# Patient Record
Sex: FEMALE | Race: WHITE | Employment: UNEMPLOYED | URBAN - METROPOLITAN AREA
[De-identification: names, ages, dates, MRNs, and addresses within clinical notes are randomized per-mention and may not be internally consistent; named-entity substitution may affect disease eponyms.]

---

## 2017-02-06 ENCOUNTER — TELEPHONE (OUTPATIENT)
Dept: OBGYN CLINIC | Facility: CLINIC | Age: 37
End: 2017-02-06

## 2017-02-06 NOTE — TELEPHONE ENCOUNTER
Pt states that lmp 12/31/16. Pt had a home positive. Pt is taking PNV with DHA. Pt agrees to see all MDs at our group. Pt informed to call if she has any problems or spotting/bleeding before her OBN appt with the nurse.    Informed pt of the date, time

## 2017-02-23 ENCOUNTER — NURSE ONLY (OUTPATIENT)
Dept: OBGYN CLINIC | Facility: CLINIC | Age: 37
End: 2017-02-23

## 2017-02-23 ENCOUNTER — TELEPHONE (OUTPATIENT)
Dept: OBGYN CLINIC | Facility: CLINIC | Age: 37
End: 2017-02-23

## 2017-02-23 DIAGNOSIS — Z34.81 ENCOUNTER FOR SUPERVISION OF OTHER NORMAL PREGNANCY IN FIRST TRIMESTER: Primary | ICD-10-CM

## 2017-02-23 DIAGNOSIS — Z34.01 ENCOUNTER FOR SUPERVISION OF NORMAL FIRST PREGNANCY IN FIRST TRIMESTER: Primary | ICD-10-CM

## 2017-02-23 LAB
CONTROL LINE PRESENT WITH A CLEAR BACKGROUND (YES/NO): YES YES/NO
KIT LOT #: NORMAL NUMERIC
PREGNANCY TEST, URINE: POSITIVE

## 2017-02-23 PROCEDURE — 81025 URINE PREGNANCY TEST: CPT | Performed by: OBSTETRICS & GYNECOLOGY

## 2017-02-23 PROCEDURE — 99211 OFF/OP EST MAY X REQ PHY/QHP: CPT | Performed by: OBSTETRICS & GYNECOLOGY

## 2017-02-23 RX ORDER — CHOLECALCIFEROL (VITAMIN D3) 25 MCG
1 TABLET,CHEWABLE ORAL DAILY
COMMUNITY
End: 2017-11-11

## 2017-02-23 NOTE — PROGRESS NOTES
Pt seen for OBN appt today with no complaints. Normal PN labs + HCV and 1 hour glucose ordered. Pt advised all labs must be completed and resulted prior to MD appt. NPN appt with MD scheduled.   Partner's name is Tai Milner contact #[phone]; race Sickle Cell Disease or trait No    Elkin-Sachs Disease No    Thalassemia No    Other inherited genetic or chromosomal disorders No    Patient or baby's father had a child with birth defects not listed above No    Previous miscarriages or stillborn No

## 2017-02-27 ENCOUNTER — LAB ENCOUNTER (OUTPATIENT)
Dept: LAB | Facility: HOSPITAL | Age: 37
End: 2017-02-27
Attending: OBSTETRICS & GYNECOLOGY
Payer: COMMERCIAL

## 2017-02-27 DIAGNOSIS — Z34.81 ENCOUNTER FOR SUPERVISION OF OTHER NORMAL PREGNANCY IN FIRST TRIMESTER: ICD-10-CM

## 2017-02-27 LAB
ANTIBODY SCREEN: NEGATIVE
BASOPHILS # BLD: 0 K/UL (ref 0–0.2)
BASOPHILS NFR BLD: 1 %
EOSINOPHIL # BLD: 0.1 K/UL (ref 0–0.7)
EOSINOPHIL NFR BLD: 1 %
ERYTHROCYTE [DISTWIDTH] IN BLOOD BY AUTOMATED COUNT: 12.6 % (ref 11–15)
GLUCOSE 1H P 50 G GLC PO SERPL-MCNC: 117 MG/DL
HCT VFR BLD AUTO: 41.2 % (ref 35–48)
HGB BLD-MCNC: 13.9 G/DL (ref 12–16)
LYMPHOCYTES # BLD: 1.5 K/UL (ref 1–4)
LYMPHOCYTES NFR BLD: 19 %
MCH RBC QN AUTO: 29.6 PG (ref 27–32)
MCHC RBC AUTO-ENTMCNC: 33.7 G/DL (ref 32–37)
MCV RBC AUTO: 87.8 FL (ref 80–100)
MONOCYTES # BLD: 0.5 K/UL (ref 0–1)
MONOCYTES NFR BLD: 6 %
NEUTROPHILS # BLD AUTO: 6.1 K/UL (ref 1.8–7.7)
NEUTROPHILS NFR BLD: 74 %
PLATELET # BLD AUTO: 312 K/UL (ref 140–400)
PMV BLD AUTO: 8.2 FL (ref 7.4–10.3)
RBC # BLD AUTO: 4.69 M/UL (ref 3.7–5.4)
RH BLOOD TYPE: POSITIVE
RUBV IGG SER-ACNC: 71.2 IU/ML
WBC # BLD AUTO: 8.2 K/UL (ref 4–11)

## 2017-02-27 PROCEDURE — 85025 COMPLETE CBC W/AUTO DIFF WBC: CPT

## 2017-02-27 PROCEDURE — 86900 BLOOD TYPING SEROLOGIC ABO: CPT

## 2017-02-27 PROCEDURE — 86901 BLOOD TYPING SEROLOGIC RH(D): CPT

## 2017-02-27 PROCEDURE — 87086 URINE CULTURE/COLONY COUNT: CPT

## 2017-02-27 PROCEDURE — 86780 TREPONEMA PALLIDUM: CPT

## 2017-02-27 PROCEDURE — 87340 HEPATITIS B SURFACE AG IA: CPT

## 2017-02-27 PROCEDURE — 36415 COLL VENOUS BLD VENIPUNCTURE: CPT

## 2017-02-27 PROCEDURE — 86803 HEPATITIS C AB TEST: CPT

## 2017-02-27 PROCEDURE — 87389 HIV-1 AG W/HIV-1&-2 AB AG IA: CPT

## 2017-02-27 PROCEDURE — 86762 RUBELLA ANTIBODY: CPT

## 2017-02-27 PROCEDURE — 86850 RBC ANTIBODY SCREEN: CPT

## 2017-02-27 PROCEDURE — 82950 GLUCOSE TEST: CPT

## 2017-02-28 LAB
HBV SURFACE AG SERPL QL IA: NONREACTIVE
HCV AB SERPL QL IA: NONREACTIVE
HIV1+2 AB SERPL QL IA: NONREACTIVE

## 2017-03-01 LAB — T PALLIDUM AB SER QL: NEGATIVE

## 2017-03-01 NOTE — TELEPHONE ENCOUNTER
Spoke to the patient informed her order for fts was sent to [facility], patient advised to call the office if she does not hear from them in 1-3 business days. Referral in process.

## 2017-03-02 ENCOUNTER — INITIAL PRENATAL (OUTPATIENT)
Dept: OBGYN CLINIC | Facility: CLINIC | Age: 37
End: 2017-03-02

## 2017-03-02 VITALS
DIASTOLIC BLOOD PRESSURE: 75 MMHG | SYSTOLIC BLOOD PRESSURE: 117 MMHG | BODY MASS INDEX: 26 KG/M2 | HEART RATE: 93 BPM | WEIGHT: 144 LBS

## 2017-03-02 DIAGNOSIS — Z34.91 ENCOUNTER FOR SUPERVISION OF NORMAL PREGNANCY IN FIRST TRIMESTER, UNSPECIFIED GRAVIDITY: Primary | ICD-10-CM

## 2017-03-02 PROBLEM — Z87.59 HISTORY OF SHOULDER DYSTOCIA IN PRIOR PREGNANCY: Status: ACTIVE | Noted: 2017-03-02

## 2017-03-02 LAB
APPEARANCE: CLEAR
MULTISTIX EXPIRATION DATE: NORMAL DATE
MULTISTIX LOT#: NORMAL NUMERIC
PH, URINE: 5 (ref 4.5–8)
SPECIFIC GRAVITY: 1.03 (ref 1–1.03)
URINE-COLOR: YELLOW

## 2017-03-02 PROCEDURE — 76801 OB US < 14 WKS SINGLE FETUS: CPT | Performed by: OBSTETRICS & GYNECOLOGY

## 2017-03-02 PROCEDURE — 99212 OFFICE O/P EST SF 10 MIN: CPT | Performed by: OBSTETRICS & GYNECOLOGY

## 2017-03-02 NOTE — PROGRESS NOTES
Gail Homes at visit. Scheduled for FTS at [facility]. 30d cycles and sure LMP within 1 d. Office US = dates. Daily nausea w/o frequent emesis.

## 2017-03-03 LAB
C TRACH DNA SPEC QL NAA+PROBE: NEGATIVE
HPV I/H RISK 1 DNA SPEC QL NAA+PROBE: NEGATIVE
N GONORRHOEA DNA SPEC QL NAA+PROBE: NEGATIVE
T VAGINALIS RRNA SPEC QL NAA+PROBE: NEGATIVE

## 2017-04-04 ENCOUNTER — TELEPHONE (OUTPATIENT)
Dept: OBGYN CLINIC | Facility: CLINIC | Age: 37
End: 2017-04-04

## 2017-04-13 ENCOUNTER — ROUTINE PRENATAL (OUTPATIENT)
Dept: OBGYN CLINIC | Facility: CLINIC | Age: 37
End: 2017-04-13

## 2017-04-13 VITALS
SYSTOLIC BLOOD PRESSURE: 113 MMHG | BODY MASS INDEX: 27 KG/M2 | WEIGHT: 149 LBS | HEART RATE: 102 BPM | DIASTOLIC BLOOD PRESSURE: 78 MMHG

## 2017-04-13 DIAGNOSIS — Z34.82 ENCOUNTER FOR SUPERVISION OF OTHER NORMAL PREGNANCY IN SECOND TRIMESTER: Primary | ICD-10-CM

## 2017-04-13 PROCEDURE — 99212 OFFICE O/P EST SF 10 MIN: CPT | Performed by: OBSTETRICS & GYNECOLOGY

## 2017-04-28 ENCOUNTER — TELEPHONE (OUTPATIENT)
Dept: OBGYN CLINIC | Facility: CLINIC | Age: 37
End: 2017-04-28

## 2017-04-28 NOTE — TELEPHONE ENCOUNTER
Has quest re: pending conchita't in May, ck'g 20wk US w-like to have it done between pending conchita'ts

## 2017-04-28 NOTE — TELEPHONE ENCOUNTER
AFTER MUCH DISCUSSION, REALIZED PT THOUGHT HER ULTRASOUND WAS TO BE DONE AT THE SAME TIME AS HER DR RICARDOT IN THE OFFICE.   EXPLAINED AN ORDER WILL BE GIVEN AT HER NEXT APPT TO SCHEDULE AN ULTRASOUND AT 20 WEEKS AND WOULD BE DONE IN THE ULTRASOUND DEPT, NOT I

## 2017-05-01 ENCOUNTER — TELEPHONE (OUTPATIENT)
Dept: OBGYN CLINIC | Facility: CLINIC | Age: 37
End: 2017-05-01

## 2017-05-01 NOTE — TELEPHONE ENCOUNTER
Pt is 17w3d, calling to report emesis x 6 today starting at 8am. Pt states her children have Rotavirus and have been throwing up and experiencing diarrhea for a few days. Pt states every time she tries to eat or drink she throws up.  Advised pt to try drink

## 2017-05-01 NOTE — TELEPHONE ENCOUNTER
Called pt back to see how she is feeling. Pt states she is able to keep down small sips of water and ice chips but if she takes large amounts of water she throws up. Advised pt to continue to drink small sips and continue to eat ice chips.  Pt advised to ca

## 2017-05-02 NOTE — TELEPHONE ENCOUNTER
Well being check call. Pt states she is much better and has not vomited since yesterday. Pt has been able to hold small meals and liquids. Advised pt to continue eating small meals for a couple days. Pt to call us if vomiting returns.

## 2017-05-11 ENCOUNTER — ROUTINE PRENATAL (OUTPATIENT)
Dept: OBGYN CLINIC | Facility: CLINIC | Age: 37
End: 2017-05-11

## 2017-05-11 ENCOUNTER — APPOINTMENT (OUTPATIENT)
Dept: LAB | Facility: HOSPITAL | Age: 37
End: 2017-05-11
Attending: OBSTETRICS & GYNECOLOGY

## 2017-05-11 VITALS
DIASTOLIC BLOOD PRESSURE: 71 MMHG | SYSTOLIC BLOOD PRESSURE: 106 MMHG | WEIGHT: 152 LBS | HEART RATE: 91 BPM | BODY MASS INDEX: 27 KG/M2

## 2017-05-11 DIAGNOSIS — Z34.92 ENCOUNTER FOR SUPERVISION OF NORMAL PREGNANCY IN SECOND TRIMESTER, UNSPECIFIED GRAVIDITY: Primary | ICD-10-CM

## 2017-05-11 DIAGNOSIS — Z34.92 ENCOUNTER FOR SUPERVISION OF NORMAL PREGNANCY IN SECOND TRIMESTER, UNSPECIFIED GRAVIDITY: ICD-10-CM

## 2017-05-11 PROCEDURE — 82105 ALPHA-FETOPROTEIN SERUM: CPT

## 2017-05-11 PROCEDURE — 36415 COLL VENOUS BLD VENIPUNCTURE: CPT

## 2017-05-11 PROCEDURE — 99212 OFFICE O/P EST SF 10 MIN: CPT | Performed by: OBSTETRICS & GYNECOLOGY

## 2017-05-19 ENCOUNTER — HOSPITAL ENCOUNTER (OUTPATIENT)
Dept: ULTRASOUND IMAGING | Facility: HOSPITAL | Age: 37
Discharge: HOME | End: 2017-05-19
Attending: OBSTETRICS & GYNECOLOGY

## 2017-05-19 DIAGNOSIS — Z34.92 ENCOUNTER FOR SUPERVISION OF NORMAL PREGNANCY IN SECOND TRIMESTER, UNSPECIFIED GRAVIDITY: ICD-10-CM

## 2017-05-19 PROCEDURE — 76805 OB US >/= 14 WKS SNGL FETUS: CPT | Performed by: OBSTETRICS & GYNECOLOGY

## 2017-05-25 ENCOUNTER — HOSPITAL ENCOUNTER (OUTPATIENT)
Dept: ULTRASOUND IMAGING | Facility: HOSPITAL | Age: 37
Discharge: HOME | End: 2017-05-25
Attending: INTERNAL MEDICINE

## 2017-05-25 DIAGNOSIS — Z34.92 ENCOUNTER FOR SUPERVISION OF NORMAL PREGNANCY IN SECOND TRIMESTER: ICD-10-CM

## 2017-06-08 ENCOUNTER — ROUTINE PRENATAL (OUTPATIENT)
Dept: OBGYN CLINIC | Facility: CLINIC | Age: 37
End: 2017-06-08

## 2017-06-08 VITALS
BODY MASS INDEX: 28 KG/M2 | WEIGHT: 154 LBS | DIASTOLIC BLOOD PRESSURE: 78 MMHG | HEART RATE: 86 BPM | SYSTOLIC BLOOD PRESSURE: 102 MMHG

## 2017-06-08 DIAGNOSIS — Z34.82 ENCOUNTER FOR SUPERVISION OF OTHER NORMAL PREGNANCY IN SECOND TRIMESTER: Primary | ICD-10-CM

## 2017-06-08 PROCEDURE — 99212 OFFICE O/P EST SF 10 MIN: CPT | Performed by: OBSTETRICS & GYNECOLOGY

## 2017-07-10 ENCOUNTER — LAB ENCOUNTER (OUTPATIENT)
Dept: LAB | Facility: HOSPITAL | Age: 37
End: 2017-07-10
Attending: OBSTETRICS & GYNECOLOGY
Payer: COMMERCIAL

## 2017-07-10 DIAGNOSIS — Z34.82 ENCOUNTER FOR SUPERVISION OF OTHER NORMAL PREGNANCY IN SECOND TRIMESTER: ICD-10-CM

## 2017-07-10 LAB
BASOPHILS # BLD: 0 K/UL (ref 0–0.2)
BASOPHILS NFR BLD: 0 %
EOSINOPHIL # BLD: 0.1 K/UL (ref 0–0.7)
EOSINOPHIL NFR BLD: 1 %
ERYTHROCYTE [DISTWIDTH] IN BLOOD BY AUTOMATED COUNT: 13 % (ref 11–15)
GLUCOSE 1H P 50 G GLC PO SERPL-MCNC: 183 MG/DL
HCT VFR BLD AUTO: 35.8 % (ref 35–48)
HGB BLD-MCNC: 12.2 G/DL (ref 12–16)
LYMPHOCYTES # BLD: 1.4 K/UL (ref 1–4)
LYMPHOCYTES NFR BLD: 20 %
MCH RBC QN AUTO: 29.8 PG (ref 27–32)
MCHC RBC AUTO-ENTMCNC: 34 G/DL (ref 32–37)
MCV RBC AUTO: 87.5 FL (ref 80–100)
MONOCYTES # BLD: 0.4 K/UL (ref 0–1)
MONOCYTES NFR BLD: 5 %
NEUTROPHILS # BLD AUTO: 5.4 K/UL (ref 1.8–7.7)
NEUTROPHILS NFR BLD: 74 %
PLATELET # BLD AUTO: 242 K/UL (ref 140–400)
PMV BLD AUTO: 7.5 FL (ref 7.4–10.3)
RBC # BLD AUTO: 4.09 M/UL (ref 3.7–5.4)
WBC # BLD AUTO: 7.3 K/UL (ref 4–11)

## 2017-07-10 PROCEDURE — 85025 COMPLETE CBC W/AUTO DIFF WBC: CPT

## 2017-07-10 PROCEDURE — 36415 COLL VENOUS BLD VENIPUNCTURE: CPT

## 2017-07-10 PROCEDURE — 82950 GLUCOSE TEST: CPT

## 2017-07-11 ENCOUNTER — APPOINTMENT (OUTPATIENT)
Dept: LAB | Facility: HOSPITAL | Age: 37
End: 2017-07-11
Attending: OBSTETRICS & GYNECOLOGY
Payer: COMMERCIAL

## 2017-07-11 ENCOUNTER — ROUTINE PRENATAL (OUTPATIENT)
Dept: OBGYN CLINIC | Facility: CLINIC | Age: 37
End: 2017-07-11

## 2017-07-11 ENCOUNTER — TELEPHONE (OUTPATIENT)
Dept: OBGYN CLINIC | Facility: CLINIC | Age: 37
End: 2017-07-11

## 2017-07-11 VITALS
WEIGHT: 157 LBS | BODY MASS INDEX: 28 KG/M2 | SYSTOLIC BLOOD PRESSURE: 108 MMHG | HEART RATE: 105 BPM | DIASTOLIC BLOOD PRESSURE: 75 MMHG

## 2017-07-11 DIAGNOSIS — Z34.82 OTHER NORMAL PREGNANCY, NOT FIRST, SECOND TRIMESTER: ICD-10-CM

## 2017-07-11 DIAGNOSIS — Z34.82 OTHER NORMAL PREGNANCY, NOT FIRST, SECOND TRIMESTER: Primary | ICD-10-CM

## 2017-07-11 LAB
APPEARANCE: CLEAR
BILIRUB UR QL: NEGATIVE
BILIRUBIN: NEGATIVE
COLOR UR: YELLOW
GLUCOSE (URINE DIPSTICK): 100 MG/DL
GLUCOSE UR-MCNC: NEGATIVE MG/DL
HGB UR QL STRIP.AUTO: NEGATIVE
KETONES UR-MCNC: NEGATIVE MG/DL
MULTISTIX LOT#: NORMAL NUMERIC
NITRITE UR QL STRIP.AUTO: NEGATIVE
NITRITE, URINE: NEGATIVE
PH UR: 6 [PH] (ref 5–8)
PH, URINE: 6.5 (ref 4.5–8)
PROT UR-MCNC: NEGATIVE MG/DL
RBC #/AREA URNS AUTO: 5 /HPF
SP GR UR STRIP: 1.02 (ref 1–1.03)
SPECIFIC GRAVITY: 1.01 (ref 1–1.03)
UROBILINOGEN UR STRIP-ACNC: <2
UROBILINOGEN,SEMI-QN: 0 MG/DL (ref 0–1.9)
VIT C UR-MCNC: NEGATIVE MG/DL
WBC #/AREA URNS AUTO: 1 /HPF

## 2017-07-11 PROCEDURE — 81001 URINALYSIS AUTO W/SCOPE: CPT

## 2017-07-11 RX ORDER — NITROFURANTOIN 25; 75 MG/1; MG/1
100 CAPSULE ORAL 2 TIMES DAILY
Qty: 14 CAPSULE | Refills: 0 | Status: SHIPPED | OUTPATIENT
Start: 2017-07-11 | End: 2017-07-18

## 2017-07-11 NOTE — PROGRESS NOTES
Cramping last night and urinary frequency. Cramping has resolved. Blood on Udip---will send to lab for UTI. Needs 3h GTT. RTC 2wks.

## 2017-07-11 NOTE — TELEPHONE ENCOUNTER
----- Message from Lon Duverney, DO sent at 7/11/2017  4:55 PM CDT -----  Please send macrobid 100mg PO BID x 7d to pharmacy and notify patient of results.

## 2017-07-23 ENCOUNTER — LAB ENCOUNTER (OUTPATIENT)
Dept: LAB | Facility: HOSPITAL | Age: 37
End: 2017-07-23
Attending: OBSTETRICS & GYNECOLOGY
Payer: COMMERCIAL

## 2017-07-23 DIAGNOSIS — Z34.82 OTHER NORMAL PREGNANCY, NOT FIRST, SECOND TRIMESTER: ICD-10-CM

## 2017-07-23 PROBLEM — O24.419 GESTATIONAL DIABETES: Status: ACTIVE | Noted: 2017-07-23

## 2017-07-23 PROBLEM — O24.419 GESTATIONAL DIABETES (HCC): Status: ACTIVE | Noted: 2017-07-23

## 2017-07-23 LAB
GLUCOSE 1H P GLC SERPL-MCNC: 203 MG/DL
GLUCOSE 2H P GLC SERPL-MCNC: 124 MG/DL
GLUCOSE 3H P GLC SERPL-MCNC: 134 MG/DL
GLUCOSE P FAST SERPL-MCNC: 98 MG/DL (ref 70–99)

## 2017-07-23 PROCEDURE — 82952 GTT-ADDED SAMPLES: CPT

## 2017-07-23 PROCEDURE — 36415 COLL VENOUS BLD VENIPUNCTURE: CPT

## 2017-07-23 PROCEDURE — 82951 GLUCOSE TOLERANCE TEST (GTT): CPT

## 2017-07-25 ENCOUNTER — TELEPHONE (OUTPATIENT)
Dept: OBGYN CLINIC | Facility: CLINIC | Age: 37
End: 2017-07-25

## 2017-07-25 ENCOUNTER — ROUTINE PRENATAL (OUTPATIENT)
Dept: OBGYN CLINIC | Facility: CLINIC | Age: 37
End: 2017-07-25

## 2017-07-25 VITALS
DIASTOLIC BLOOD PRESSURE: 83 MMHG | HEART RATE: 103 BPM | SYSTOLIC BLOOD PRESSURE: 117 MMHG | BODY MASS INDEX: 29 KG/M2 | WEIGHT: 159 LBS

## 2017-07-25 DIAGNOSIS — Z34.93 ENCOUNTER FOR SUPERVISION OF NORMAL PREGNANCY IN THIRD TRIMESTER, UNSPECIFIED GRAVIDITY: Primary | ICD-10-CM

## 2017-07-25 DIAGNOSIS — O24.419 GESTATIONAL DIABETES MELLITUS (GDM) IN THIRD TRIMESTER, GESTATIONAL DIABETES METHOD OF CONTROL UNSPECIFIED: Primary | ICD-10-CM

## 2017-07-25 LAB
MULTISTIX LOT#: NORMAL NUMERIC
PH, URINE: 7.5 (ref 4.5–8)
SPECIFIC GRAVITY: 1 (ref 1–1.03)
UROBILINOGEN,SEMI-QN: 0.2 MG/DL (ref 0–1.9)

## 2017-07-25 NOTE — TELEPHONE ENCOUNTER
Pt was at the  checking out from PN appt. Pt provided with phone # to [facility] ED and instructed to call today to set up appt within the next few days. Pt verbalized understanding.

## 2017-07-28 ENCOUNTER — HOSPITAL ENCOUNTER (OUTPATIENT)
Dept: ENDOCRINOLOGY | Facility: HOSPITAL | Age: 37
Discharge: HOME | End: 2017-07-28
Attending: OBSTETRICS & GYNECOLOGY
Payer: COMMERCIAL

## 2017-07-28 VITALS — BODY MASS INDEX: 29 KG/M2 | WEIGHT: 159.13 LBS

## 2017-07-28 DIAGNOSIS — O24.419 GESTATIONAL DIABETES MELLITUS (GDM) IN THIRD TRIMESTER, GESTATIONAL DIABETES METHOD OF CONTROL UNSPECIFIED: ICD-10-CM

## 2017-07-28 RX ORDER — BLOOD-GLUCOSE METER
1 KIT MISCELLANEOUS 4 TIMES DAILY
Qty: 1 KIT | Refills: 0 | Status: SHIPPED | OUTPATIENT
Start: 2017-07-28 | End: 2017-11-11

## 2017-07-28 RX ORDER — LANCETS 28 GAUGE
1 EACH MISCELLANEOUS 4 TIMES DAILY
Qty: 1 BOX | Refills: 3 | Status: SHIPPED | OUTPATIENT
Start: 2017-07-28 | End: 2017-10-10

## 2017-07-28 NOTE — PROGRESS NOTES
Stephaniejanna Robert  : 1980 was seen for Gestational Diabetes Counseling: Individual/Group    Date: 2017   Start time: 8 AM End time: 10 AM     Obtained usual diet history: She has 3 meals/day.  Meals are relatively moderate in carbs, include protei activity if no restrictions. 5. Encouraged Alicia to call diabetes center with any questions or concerns. Patient verbalized understanding and has no further questions at this time.     Alda Suarez RN, CDE

## 2017-07-31 ENCOUNTER — TELEPHONE (OUTPATIENT)
Dept: OBGYN CLINIC | Facility: CLINIC | Age: 37
End: 2017-07-31

## 2017-07-31 NOTE — TELEPHONE ENCOUNTER
RC/ 7/29 103 upon waking, 2 hrs 101 after breakfast, lunch 130 after dinner 130.    7/30  98 upon waking, breakfast 117, lunch 138, dinner 96.    7/31 upon waking was 94 , 2 hrs after breakfast was 107. `    Leave VM if no answer [phone]

## 2017-07-31 NOTE — TELEPHONE ENCOUNTER
Informed pt that Elise Kessler looked and her bs and stated that she needs to call Thursday with bs, 8/3/17. Pt stated understanding.

## 2017-08-03 ENCOUNTER — TELEPHONE (OUTPATIENT)
Dept: OBGYN CLINIC | Facility: CLINIC | Age: 37
End: 2017-08-03

## 2017-08-03 DIAGNOSIS — O24.414 INSULIN CONTROLLED GESTATIONAL DIABETES MELLITUS (GDM) IN THIRD TRIMESTER: Primary | ICD-10-CM

## 2017-08-03 NOTE — TELEPHONE ENCOUNTER
Blood sugars recorded. Pt is diet controlled. Per Arnold Echavarria on call, to be reviewed first thing in AM as no MD in office tonight. Pt aware.

## 2017-08-04 NOTE — TELEPHONE ENCOUNTER
NJG reviewed pts BS log and started pt on insulin and would also like for pt to start weekly NSTs. ADINA stated that pt needs to start 0 + 4 + 0 + 4 N. Order sent to [facility] ED with insulin regimen instructions.  Spoke with Aislinn Paredes from [facility] ED who stated she will get

## 2017-08-05 ENCOUNTER — HOSPITAL ENCOUNTER (OUTPATIENT)
Dept: ENDOCRINOLOGY | Facility: HOSPITAL | Age: 37
Discharge: HOME | End: 2017-08-05
Attending: OBSTETRICS & GYNECOLOGY
Payer: COMMERCIAL

## 2017-08-05 DIAGNOSIS — O24.419 GESTATIONAL DIABETES MELLITUS (GDM) IN SECOND TRIMESTER, GESTATIONAL DIABETES METHOD OF CONTROL UNSPECIFIED: Primary | ICD-10-CM

## 2017-08-05 NOTE — PROGRESS NOTES
Lise Cox  : 1980 was seen for Injection Instruction:    Date: 2017   Start time: 12:00pm End time: 1:15pm    LMP 2016 (Exact Date)       Medication type, dose and frequency: NPH, 4 units at bedtime and Humalog, 4 units ac lunch

## 2017-08-07 ENCOUNTER — HOSPITAL ENCOUNTER (OUTPATIENT)
Facility: HOSPITAL | Age: 37
Discharge: HOME | End: 2017-08-07
Attending: OBSTETRICS & GYNECOLOGY | Admitting: OBSTETRICS & GYNECOLOGY
Payer: COMMERCIAL

## 2017-08-07 ENCOUNTER — TELEPHONE (OUTPATIENT)
Dept: PEDIATRICS CLINIC | Facility: CLINIC | Age: 37
End: 2017-08-07

## 2017-08-07 ENCOUNTER — APPOINTMENT (OUTPATIENT)
Dept: OBGYN CLINIC | Facility: HOSPITAL | Age: 37
End: 2017-08-07
Payer: COMMERCIAL

## 2017-08-07 VITALS — HEART RATE: 99 BPM | DIASTOLIC BLOOD PRESSURE: 76 MMHG | SYSTOLIC BLOOD PRESSURE: 113 MMHG

## 2017-08-07 PROCEDURE — 59025 FETAL NON-STRESS TEST: CPT

## 2017-08-07 RX ORDER — INSULIN LISPRO 100 [IU]/ML
23 INJECTION, SOLUTION INTRAVENOUS; SUBCUTANEOUS NIGHTLY
Status: ON HOLD | COMMUNITY
End: 2017-09-21

## 2017-08-07 NOTE — TELEPHONE ENCOUNTER
Pt 31w3d calling in her BS log. Pt is currently on 0 + 4 + 0 + 4N for insulin regimen. Pt is starting weekly NSTs tonight at [facility]. Message to Guerline Mittal (on call) to please review BS log since there are no providers in the office currently. 8/4:  Moderate Ketone

## 2017-08-07 NOTE — TELEPHONE ENCOUNTER
Pt informed of KCBs recs and verbalized understanding. Pt also instructed to call in next BS log in 4 days. Pt verbalized understanding. Pts BS log placed on KCBs desk so she can sign off on it before it gets sent to scanning.

## 2017-08-07 NOTE — NST
Nonstress Test   Patient: Addie Anthony    Gestation: 31w3d    NST: A2GDM       Variability: Moderate           Accelerations: Yes           Decelerations: None            Baseline: 135 BPM           Uterine Irritability: No           Contractions: Irregul

## 2017-08-08 ENCOUNTER — TELEPHONE (OUTPATIENT)
Dept: OBGYN CLINIC | Facility: CLINIC | Age: 37
End: 2017-08-08

## 2017-08-08 NOTE — TELEPHONE ENCOUNTER
Pt calling to report her post prandial blood sugar was 155 this morning.  Pt states she ate one piece of toast and a piece of turkey sausage for breakfast. Informed pt that we will need to see 3-4 days of values in order to see a trend and to titrate insuli

## 2017-08-10 ENCOUNTER — ROUTINE PRENATAL (OUTPATIENT)
Dept: OBGYN CLINIC | Facility: CLINIC | Age: 37
End: 2017-08-10

## 2017-08-10 VITALS
WEIGHT: 159.81 LBS | HEART RATE: 93 BPM | BODY MASS INDEX: 29 KG/M2 | SYSTOLIC BLOOD PRESSURE: 110 MMHG | DIASTOLIC BLOOD PRESSURE: 76 MMHG

## 2017-08-10 DIAGNOSIS — Z34.93 ENCOUNTER FOR SUPERVISION OF NORMAL PREGNANCY IN THIRD TRIMESTER, UNSPECIFIED GRAVIDITY: Primary | ICD-10-CM

## 2017-08-10 LAB
MULTISTIX LOT#: NORMAL NUMERIC
PH, URINE: 7 (ref 4.5–8)
SPECIFIC GRAVITY: 1.01 (ref 1–1.03)
UROBILINOGEN,SEMI-QN: 0.2 MG/DL (ref 0–1.9)

## 2017-08-10 RX ORDER — PEN NEEDLE, DIABETIC 32GX 5/32"
NEEDLE, DISPOSABLE MISCELLANEOUS
Refills: 2 | COMMUNITY
Start: 2017-08-05 | End: 2017-11-11

## 2017-08-10 RX ORDER — INSULIN LISPRO 100 [IU]/ML
INJECTION, SOLUTION INTRAVENOUS; SUBCUTANEOUS
Refills: 2 | Status: ON HOLD | COMMUNITY
Start: 2017-08-05 | End: 2017-09-21

## 2017-08-10 NOTE — PROGRESS NOTES
Current insulin 0+4+0+6N. Diary reviewed and we'll increase NPH to 10. Call / fax in 4 days. Watch meal values. NST's already begun.

## 2017-08-15 ENCOUNTER — HOSPITAL ENCOUNTER (INPATIENT)
Facility: HOSPITAL | Age: 37
LOS: 2 days | Discharge: HOME | End: 2017-08-17
Attending: OBSTETRICS & GYNECOLOGY | Admitting: OBSTETRICS & GYNECOLOGY
Payer: COMMERCIAL

## 2017-08-15 ENCOUNTER — APPOINTMENT (OUTPATIENT)
Dept: OBGYN CLINIC | Facility: HOSPITAL | Age: 37
End: 2017-08-15
Payer: COMMERCIAL

## 2017-08-15 PROBLEM — O60.00 PRETERM LABOR (HCC): Status: ACTIVE | Noted: 2017-08-15

## 2017-08-15 PROBLEM — O60.00 PRETERM LABOR: Status: ACTIVE | Noted: 2017-08-15

## 2017-08-15 LAB
BACTERIA UR QL AUTO: NEGATIVE /HPF
BILIRUB UR QL: NEGATIVE
CLARITY UR: CLEAR
COLOR UR: YELLOW
FIBRONECTIN FETAL SPEC QL: NEGATIVE
GLUCOSE BLDC GLUCOMTR-MCNC: 153 MG/DL (ref 70–99)
GLUCOSE UR-MCNC: NEGATIVE MG/DL
KETONES UR-MCNC: 20 MG/DL
LEUKOCYTE ESTERASE UR QL STRIP.AUTO: NEGATIVE
NITRITE UR QL STRIP.AUTO: NEGATIVE
PH UR: 7 [PH] (ref 5–8)
PROT UR-MCNC: NEGATIVE MG/DL
RBC #/AREA URNS AUTO: <1 /HPF
SP GR UR STRIP: 1.01 (ref 1–1.03)
UROBILINOGEN UR STRIP-ACNC: <2
VIT C UR-MCNC: NEGATIVE MG/DL
WBC #/AREA URNS AUTO: <1 /HPF

## 2017-08-15 RX ORDER — BETAMETHASONE SODIUM PHOSPHATE AND BETAMETHASONE ACETATE 3; 3 MG/ML; MG/ML
12 INJECTION, SUSPENSION INTRA-ARTICULAR; INTRALESIONAL; INTRAMUSCULAR; SOFT TISSUE EVERY 24 HOURS
Status: DISCONTINUED | OUTPATIENT
Start: 2017-08-15 | End: 2017-08-17

## 2017-08-15 RX ORDER — CALCIUM GLUCONATE 94 MG/ML
1 INJECTION, SOLUTION INTRAVENOUS ONCE AS NEEDED
Status: DISPENSED | OUTPATIENT
Start: 2017-08-15 | End: 2017-08-15

## 2017-08-15 RX ORDER — LIDOCAINE HYDROCHLORIDE 10 MG/ML
30 INJECTION, SOLUTION EPIDURAL; INFILTRATION; INTRACAUDAL; PERINEURAL ONCE
Status: DISCONTINUED | OUTPATIENT
Start: 2017-08-15 | End: 2017-08-17

## 2017-08-15 RX ORDER — SODIUM CHLORIDE, SODIUM LACTATE, POTASSIUM CHLORIDE, CALCIUM CHLORIDE 600; 310; 30; 20 MG/100ML; MG/100ML; MG/100ML; MG/100ML
INJECTION, SOLUTION INTRAVENOUS CONTINUOUS
Status: DISCONTINUED | OUTPATIENT
Start: 2017-08-15 | End: 2017-08-17

## 2017-08-15 NOTE — TELEPHONE ENCOUNTER
JLK reviewed blood sugars and has changed insulin to 2+6+0+14N. Pt informed and verbalizes understanding.

## 2017-08-16 LAB
GLUCOSE BLDC GLUCOMTR-MCNC: 115 MG/DL (ref 70–99)
GLUCOSE BLDC GLUCOMTR-MCNC: 71 MG/DL (ref 70–99)
GLUCOSE BLDC GLUCOMTR-MCNC: 86 MG/DL (ref 70–99)
MAGNESIUM SERPL-MCNC: 4.7 MG/DL (ref 4.8–8.4)
MAGNESIUM SERPL-MCNC: 4.8 MG/DL (ref 4.8–8.4)
MAGNESIUM SERPL-MCNC: 5.1 MG/DL (ref 4.8–8.4)
TROPONIN I SERPL-MCNC: 0 NG/ML (ref ?–0.03)

## 2017-08-16 PROCEDURE — 99253 IP/OBS CNSLTJ NEW/EST LOW 45: CPT | Performed by: HOSPITALIST

## 2017-08-16 RX ORDER — ONDANSETRON 2 MG/ML
INJECTION INTRAMUSCULAR; INTRAVENOUS
Status: DISPENSED
Start: 2017-08-16 | End: 2017-08-16

## 2017-08-16 RX ORDER — ONDANSETRON 2 MG/ML
4 INJECTION INTRAMUSCULAR; INTRAVENOUS EVERY 6 HOURS PRN
Status: DISCONTINUED | OUTPATIENT
Start: 2017-08-16 | End: 2017-08-17

## 2017-08-16 RX ORDER — ACETAMINOPHEN 325 MG/1
650 TABLET ORAL EVERY 6 HOURS PRN
Status: DISCONTINUED | OUTPATIENT
Start: 2017-08-16 | End: 2017-08-17

## 2017-08-16 RX ORDER — SODIUM CHLORIDE, SODIUM LACTATE, POTASSIUM CHLORIDE, CALCIUM CHLORIDE 600; 310; 30; 20 MG/100ML; MG/100ML; MG/100ML; MG/100ML
INJECTION, SOLUTION INTRAVENOUS
Status: DISPENSED
Start: 2017-08-16 | End: 2017-08-16

## 2017-08-16 RX ORDER — MAGNESIUM HYDROXIDE/ALUMINUM HYDROXICE/SIMETHICONE 120; 1200; 1200 MG/30ML; MG/30ML; MG/30ML
30 SUSPENSION ORAL 4 TIMES DAILY PRN
Status: DISCONTINUED | OUTPATIENT
Start: 2017-08-16 | End: 2017-08-17

## 2017-08-16 RX ORDER — ONDANSETRON 2 MG/ML
4 INJECTION INTRAMUSCULAR; INTRAVENOUS ONCE
Status: COMPLETED | OUTPATIENT
Start: 2017-08-16 | End: 2017-08-16

## 2017-08-16 NOTE — H&P
[address] [facility] Patient Status:  Inpatient    1980 MRN [id number]   Location 9 [facility] Attending Anita Ho MD   Hosp Day # 0 PCP Laurel Pate MD     Date of Admissio occasionally        Allergies/Medications:    Allergies:   No Known Allergies  Medications:    Prescriptions Prior to Admission:  HUMALOG KWIKPEN 100 UNIT/ML Subcutaneous Solution Pen-injector INJECT 4 UNITS AT LUNCH AS DIRECTED Disp:  Rfl: 2 Taking   BD PE GLUUR Negative 08/15/2017   BILUR Negative 08/15/2017   KETUR 20  08/15/2017   BLOODURINE Trace 08/15/2017   PHURINE 7.0 08/15/2017   PROUR Negative 08/15/2017   UROBILINOGEN <2.0 08/15/2017   NITRITE Negative 08/15/2017   LEUUR Negative 08/15/2017     U

## 2017-08-17 VITALS
HEIGHT: 61 IN | BODY MASS INDEX: 30.21 KG/M2 | SYSTOLIC BLOOD PRESSURE: 102 MMHG | OXYGEN SATURATION: 96 % | DIASTOLIC BLOOD PRESSURE: 62 MMHG | WEIGHT: 160 LBS | HEART RATE: 84 BPM | TEMPERATURE: 99 F | RESPIRATION RATE: 16 BRPM

## 2017-08-17 LAB
GLUCOSE BLDC GLUCOMTR-MCNC: 104 MG/DL (ref 70–99)
GLUCOSE BLDC GLUCOMTR-MCNC: 115 MG/DL (ref 70–99)
GLUCOSE BLDC GLUCOMTR-MCNC: 159 MG/DL (ref 70–99)
GLUCOSE BLDC GLUCOMTR-MCNC: 196 MG/DL (ref 70–99)
TROPONIN I SERPL-MCNC: 0 NG/ML (ref ?–0.03)

## 2017-08-17 PROCEDURE — 99232 SBSQ HOSP IP/OBS MODERATE 35: CPT | Performed by: HOSPITALIST

## 2017-08-17 PROCEDURE — 99233 SBSQ HOSP IP/OBS HIGH 50: CPT | Performed by: OBSTETRICS & GYNECOLOGY

## 2017-08-17 RX ORDER — INSULIN ASPART 100 [IU]/ML
4 INJECTION, SOLUTION INTRAVENOUS; SUBCUTANEOUS ONCE
Status: COMPLETED | OUTPATIENT
Start: 2017-08-17 | End: 2017-08-17

## 2017-08-17 NOTE — PROGRESS NOTES
DR Mary Harrell PHONED IN AND INFORMED RN THAT DR CONLEY, HOSPITALIST, WILL BE UP TO SEE THE PT; Shey Thomas

## 2017-08-17 NOTE — CONSULTS
[address] [place] [address] Patient Status:  Inpatient    1980 MRN [id number]   Location [address] [place] [address] Attending Marcelina Chen MD   Hosp Day # 1 PCP Brenna Staley MD     Date:  2017 drugs. Allergies:  No Known Allergies    Home Medications:  Prior to Admission Medications   Prescriptions Last Dose Informant Patient Reported? Taking?    Acetone, Urine, Test (KETOSTIX) In Vitro Strip   No No   Si strip by Does not apply route luz oriented. Diffuse skin problem:  None. Eye:  Pupils are equal, round and reactive to light, extraocular movements are intact, Normal conjunctiva. HENT:  Normocephalic, oral mucosa is moist.  Head:  Normocephalic, atraumatic.   Neck:  Supple, non-tender,

## 2017-08-17 NOTE — PROGRESS NOTES
[facility]    Progress Note    Natasha Flaherty Patient Status:  Inpatient    1980 MRN [id number]   Location [address] [facility] Attending Titi Main MD   Hosp Day # 2 PCP Lydia Jade MD       Subjective:   Cheryle Hamilton 12-lead    Result Date: 8/16/2017  ECG Report  Interpretation  --------------------------       Results:     CBC:    Lab Results  Component Value Date   WBC 7.3 07/10/2017   WBC 8.2 02/27/2017   WBC 6.7 11/23/2015     Lab Results  Component Value Date   HG

## 2017-08-17 NOTE — PROGRESS NOTES
8/17/2017, 10:00 AM    Subjective:    Had chest pain that radiated to left arm last night. Was seen by hospitalist.  Workup negative. Chest pain resolved. Has rare ctx off magnesium.       Objective:   08/16/17  2200 08/16/17  2230 08/17/17  0130 08/17/

## 2017-08-17 NOTE — PROGRESS NOTES
Pt seen by dr. Leif Stone. Discharge orders given. Pt will follow up as scheduled for prenatal visits. Will return if u/c or worse.

## 2017-08-17 NOTE — PLAN OF CARE
ANTEPARTUM/LABOR and DELIVERY    • Maintain pregnancy as long as maternal and/or fetal condition is stable Progressing    • Anxiety is at manageable level Progressing    • Demonstrates ability to cope with hospitalization/illness Progressing        NEUROLO

## 2017-08-17 NOTE — DISCHARGE SUMMARY
[facility]    Discharge Summary    Jerrod Gonzalez Patient Status:  Inpatient    1980 MRN [id number]   Location [address]  Attending Arlie Lei, MD Marylee Schaumann Day # 2       Delivering OB Clinician:  Dr Polo Helms

## 2017-08-17 NOTE — PROGRESS NOTES
DR Dusty Dias RETURNED PAGE AND IN FORMED OF PT C/O CHEST TIGHTNESS THAT RADIATES TO LEFT ARM/LEFT ARM ACHE.  [address] WILL CALL HOSPITALIST FOR FURTHER EVALUATION.

## 2017-08-23 ENCOUNTER — APPOINTMENT (OUTPATIENT)
Dept: OBGYN CLINIC | Facility: HOSPITAL | Age: 37
End: 2017-08-23
Payer: COMMERCIAL

## 2017-08-23 ENCOUNTER — HOSPITAL ENCOUNTER (OUTPATIENT)
Facility: HOSPITAL | Age: 37
Discharge: HOME | End: 2017-08-23
Attending: OBSTETRICS & GYNECOLOGY | Admitting: OBSTETRICS & GYNECOLOGY
Payer: COMMERCIAL

## 2017-08-23 PROCEDURE — 59025 FETAL NON-STRESS TEST: CPT | Performed by: OBSTETRICS & GYNECOLOGY

## 2017-08-23 NOTE — NST
Nonstress Test   Patient: Marcia Addison    Gestation: 33w5d    NST:A2GDM     Variability: Moderate           Accelerations: Yes           Decelerations: None            Baseline: 130 BPM           Uterine Irritability: No           Contractions: Not presen

## 2017-08-24 ENCOUNTER — TELEPHONE (OUTPATIENT)
Dept: PEDIATRICS CLINIC | Facility: CLINIC | Age: 37
End: 2017-08-24

## 2017-08-24 NOTE — TELEPHONE ENCOUNTER
Pt states that she is feeling better. Pt ate toast this afternoon and water. Pt is going to try soup for dinner. Pt asking if she should take bedtime insulin or just wait until tomorrow. Pt has not checked her blood sugars today.  Will discuss with ANGELY on c

## 2017-08-24 NOTE — TELEPHONE ENCOUNTER
33w6d, . Pt states that she has the stomach flu. She has been sick since last night. Denies a fever. (Children were sick.)  She has vomited 4 times and has had diarrhea once. Pt states that she can not keep any food or liquid down at all.   Denies

## 2017-08-24 NOTE — TELEPHONE ENCOUNTER
Waiting for NJG to call for recs. Pt states that she drank some Gatorade since we talked about 6 oz and has been able to keep it down.

## 2017-08-24 NOTE — TELEPHONE ENCOUNTER
Per JLK on call, pt to check 2 hr PP after eating soup. Pt should still take NPH tonight. Per JLK, if blood sugar after dinner is low, can take 7 units NPH instead of 14. Pt verbalizes understanding.

## 2017-08-24 NOTE — TELEPHONE ENCOUNTER
Discussed with Honey Barton MD on Call, for the afternoon. Pt needs to be informed not to take her short acting insulin for today in the am and the lunch. Pt is to try to add liquid slowly and try to keep it down.   Pt needs to call us later today to let us know

## 2017-08-24 NOTE — TELEPHONE ENCOUNTER
Pt has a few questions   Pt wants to know If she should be taking her insulin if she is not eating. ? Also states she was in  labor last week, and states if she should wait to be seen  Another day since she has an appt today?   pls adv

## 2017-08-29 ENCOUNTER — ROUTINE PRENATAL (OUTPATIENT)
Dept: OBGYN CLINIC | Facility: CLINIC | Age: 37
End: 2017-08-29

## 2017-08-29 VITALS
DIASTOLIC BLOOD PRESSURE: 72 MMHG | HEART RATE: 106 BPM | WEIGHT: 157 LBS | BODY MASS INDEX: 30 KG/M2 | SYSTOLIC BLOOD PRESSURE: 90 MMHG

## 2017-08-29 DIAGNOSIS — Z34.93 ENCOUNTER FOR SUPERVISION OF NORMAL PREGNANCY IN THIRD TRIMESTER, UNSPECIFIED GRAVIDITY: Primary | ICD-10-CM

## 2017-08-29 LAB
APPEARANCE: CLEAR
MULTISTIX LOT#: NORMAL NUMERIC
PH, URINE: 7 (ref 4.5–8)
SPECIFIC GRAVITY: 1.01 (ref 1–1.03)
URINE-COLOR: YELLOW
UROBILINOGEN,SEMI-QN: 0.2 MG/DL (ref 0–1.9)

## 2017-08-30 ENCOUNTER — HOSPITAL ENCOUNTER (OUTPATIENT)
Facility: HOSPITAL | Age: 37
Discharge: HOME | End: 2017-08-30
Attending: OBSTETRICS & GYNECOLOGY | Admitting: OBSTETRICS & GYNECOLOGY
Payer: COMMERCIAL

## 2017-08-30 ENCOUNTER — HOSPITAL ENCOUNTER (OUTPATIENT)
Dept: OBGYN CLINIC | Facility: HOSPITAL | Age: 37
Discharge: HOME | End: 2017-08-30
Payer: COMMERCIAL

## 2017-08-30 PROCEDURE — 59025 FETAL NON-STRESS TEST: CPT | Performed by: OBSTETRICS & GYNECOLOGY

## 2017-08-30 NOTE — NST
Nonstress Test   Patient: Marcia Addison    Gestation: 34w5d    NST:       Variability: Moderate           Accelerations: Yes           Decelerations: None            Baseline: 130 BPM           Uterine Irritability: No           Contractions: Irregular

## 2017-08-30 NOTE — NST
Nonstress Test   Patient: Efrain Bravo    Gestation: 34w5d    NST:a2gdm       Variability: Moderate           Accelerations: Yes           Decelerations: None            Baseline: 130 BPM           Uterine Irritability: No           Contractions: Cherre Pepper

## 2017-09-01 ENCOUNTER — TELEPHONE (OUTPATIENT)
Dept: OBGYN CLINIC | Facility: CLINIC | Age: 37
End: 2017-09-01

## 2017-09-05 ENCOUNTER — ROUTINE PRENATAL (OUTPATIENT)
Dept: OBGYN CLINIC | Facility: CLINIC | Age: 37
End: 2017-09-05

## 2017-09-05 VITALS
SYSTOLIC BLOOD PRESSURE: 115 MMHG | BODY MASS INDEX: 29 KG/M2 | HEART RATE: 102 BPM | DIASTOLIC BLOOD PRESSURE: 82 MMHG | WEIGHT: 155.38 LBS

## 2017-09-05 DIAGNOSIS — Z34.93 ENCOUNTER FOR SUPERVISION OF NORMAL PREGNANCY IN THIRD TRIMESTER, UNSPECIFIED GRAVIDITY: Primary | ICD-10-CM

## 2017-09-05 LAB
LEUKOCYTES: 2
MULTISTIX LOT#: NORMAL NUMERIC
PH, URINE: 6.5 (ref 4.5–8)
SPECIFIC GRAVITY: 1.01 (ref 1–1.03)
UROBILINOGEN,SEMI-QN: 0 MG/DL (ref 0–1.9)

## 2017-09-05 RX ORDER — INSULIN HUMAN 100 [IU]/ML
INJECTION, SUSPENSION SUBCUTANEOUS
Status: ON HOLD | COMMUNITY
Start: 2017-08-31 | End: 2017-09-20

## 2017-09-05 NOTE — PROGRESS NOTES
No issues reported. GBS collected. Was positive a few weeks ago with PTL admission. Growth ordered. BS log reviewed. Inc to 2+10+6+23N. NST tomorrow.

## 2017-09-06 ENCOUNTER — HOSPITAL ENCOUNTER (OUTPATIENT)
Facility: HOSPITAL | Age: 37
Discharge: HOME | End: 2017-09-06
Attending: OBSTETRICS & GYNECOLOGY | Admitting: OBSTETRICS & GYNECOLOGY
Payer: COMMERCIAL

## 2017-09-06 ENCOUNTER — HOSPITAL ENCOUNTER (OUTPATIENT)
Dept: OBGYN CLINIC | Facility: HOSPITAL | Age: 37
Discharge: HOME | End: 2017-09-06
Payer: COMMERCIAL

## 2017-09-06 PROBLEM — Z36.89 NST (NON-STRESS TEST) REACTIVE: Status: ACTIVE | Noted: 2017-09-06

## 2017-09-06 PROBLEM — Z36.89 NST (NON-STRESS TEST) REACTIVE (HCC): Status: ACTIVE | Noted: 2017-09-06

## 2017-09-06 PROCEDURE — 59025 FETAL NON-STRESS TEST: CPT | Performed by: OBSTETRICS & GYNECOLOGY

## 2017-09-07 NOTE — TRIAGE
[facility]      Triage Note    Dandy Slider Patient Status:  Outpatient in a Bed    1980 MRN [id number]   Location [address]  Attending Van Vera MD   Hosp Day # 0 PCP MD Clemencia Lloyd for GDM. Reactive NST. Ctx noted, pt states she \"has katia motley but they always go away. \"  Dr Zari Henriquez notified, no new orders, pt to be d/c'd home.       Cristina Albright RN  9/6/2017 7:41 PM    NST note     I have reviewed the NST and agree with the

## 2017-09-09 ENCOUNTER — TELEPHONE (OUTPATIENT)
Dept: OBGYN CLINIC | Facility: CLINIC | Age: 37
End: 2017-09-09

## 2017-09-11 NOTE — TELEPHONE ENCOUNTER
Pt notified BS log was reviewed by CARLY and increase to insulin as follows: 4 + 12 + 9 + 23 NPH. Pt instructed to send us next log in 3-4 days. Pt verbalized understanding.

## 2017-09-12 ENCOUNTER — LAB ENCOUNTER (OUTPATIENT)
Dept: LAB | Facility: HOSPITAL | Age: 37
End: 2017-09-12
Attending: OBSTETRICS & GYNECOLOGY
Payer: COMMERCIAL

## 2017-09-12 ENCOUNTER — ROUTINE PRENATAL (OUTPATIENT)
Dept: OBGYN CLINIC | Facility: CLINIC | Age: 37
End: 2017-09-12

## 2017-09-12 ENCOUNTER — HOSPITAL ENCOUNTER (OUTPATIENT)
Dept: ULTRASOUND IMAGING | Facility: HOSPITAL | Age: 37
Discharge: HOME | End: 2017-09-12
Attending: OBSTETRICS & GYNECOLOGY
Payer: COMMERCIAL

## 2017-09-12 VITALS
BODY MASS INDEX: 30 KG/M2 | HEART RATE: 90 BPM | SYSTOLIC BLOOD PRESSURE: 108 MMHG | DIASTOLIC BLOOD PRESSURE: 70 MMHG | WEIGHT: 159.63 LBS

## 2017-09-12 DIAGNOSIS — Z34.93 ENCOUNTER FOR SUPERVISION OF NORMAL PREGNANCY IN THIRD TRIMESTER, UNSPECIFIED GRAVIDITY: ICD-10-CM

## 2017-09-12 DIAGNOSIS — Z34.93 ENCOUNTER FOR SUPERVISION OF NORMAL PREGNANCY IN THIRD TRIMESTER, UNSPECIFIED GRAVIDITY: Primary | ICD-10-CM

## 2017-09-12 LAB
ERYTHROCYTE [DISTWIDTH] IN BLOOD BY AUTOMATED COUNT: 13.4 % (ref 11–15)
HCT VFR BLD AUTO: 39.4 % (ref 35–48)
HGB BLD-MCNC: 13.2 G/DL (ref 12–16)
MCH RBC QN AUTO: 29.3 PG (ref 27–32)
MCHC RBC AUTO-ENTMCNC: 33.5 G/DL (ref 32–37)
MCV RBC AUTO: 87.3 FL (ref 80–100)
MULTISTIX LOT#: NORMAL NUMERIC
PH, URINE: 7 (ref 4.5–8)
PLATELET # BLD AUTO: 230 K/UL (ref 140–400)
PMV BLD AUTO: 8.6 FL (ref 7.4–10.3)
RBC # BLD AUTO: 4.51 M/UL (ref 3.7–5.4)
SPECIFIC GRAVITY: 1.01 (ref 1–1.03)
UROBILINOGEN,SEMI-QN: 0.2 MG/DL (ref 0–1.9)
WBC # BLD AUTO: 7 K/UL (ref 4–11)

## 2017-09-12 PROCEDURE — 85027 COMPLETE CBC AUTOMATED: CPT

## 2017-09-12 PROCEDURE — 86780 TREPONEMA PALLIDUM: CPT

## 2017-09-12 PROCEDURE — 76816 OB US FOLLOW-UP PER FETUS: CPT | Performed by: OBSTETRICS & GYNECOLOGY

## 2017-09-12 PROCEDURE — 36415 COLL VENOUS BLD VENIPUNCTURE: CPT

## 2017-09-12 NOTE — PROGRESS NOTES
Had insulin changed yesterday-- states that Insulin 4+12+9+23N. GBS positive. Will do 35 wk labs today. Had growth u/s today. RTC 1 wk.

## 2017-09-13 ENCOUNTER — TELEPHONE (OUTPATIENT)
Dept: PEDIATRICS CLINIC | Facility: CLINIC | Age: 37
End: 2017-09-13

## 2017-09-13 NOTE — PAYOR COMM NOTE
--------------  DISCHARGE REVIEW    Payor: [facility]  Subscriber #:  [id number]  Authorization Number: [id number]    Admit date: 8/15/17  Admit time:  1922  Discharge Date: 8/17/2017 11:00 AM     Admitting Physician: Robin Piña MD  Attending [id number]    Admit date: 8/15/17  Admit time: 1922       Admitting Physician: Ebony Manning MD  Attending Physician:  No att. 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

## 2017-09-13 NOTE — TELEPHONE ENCOUNTER
Pt informed growth US shows single live IUP with normal progression of growth from [address] done in May. Pt informed EFW is 6lbs 8oz. Pt verbalized understanding. Message to [facility] [employer]  (on call) for sign off.

## 2017-09-15 ENCOUNTER — HOSPITAL ENCOUNTER (OUTPATIENT)
Dept: OBGYN CLINIC | Facility: HOSPITAL | Age: 37
Discharge: HOME | End: 2017-09-15
Payer: COMMERCIAL

## 2017-09-15 ENCOUNTER — HOSPITAL ENCOUNTER (OUTPATIENT)
Facility: HOSPITAL | Age: 37
Discharge: HOME | End: 2017-09-15
Attending: OBSTETRICS & GYNECOLOGY | Admitting: OBSTETRICS & GYNECOLOGY
Payer: COMMERCIAL

## 2017-09-15 VITALS — HEART RATE: 85 BPM | SYSTOLIC BLOOD PRESSURE: 115 MMHG | DIASTOLIC BLOOD PRESSURE: 72 MMHG

## 2017-09-15 PROCEDURE — 59025 FETAL NON-STRESS TEST: CPT | Performed by: OBSTETRICS & GYNECOLOGY

## 2017-09-16 NOTE — NST
Nonstress Test   Patient: Tilford Boas    Gestation: 37w0d    NST: A2GDM       Variability: Moderate           Accelerations: Yes           Decelerations: None            Baseline: 125 BPM           Uterine Irritability: No           Contractions: Irregul

## 2017-09-19 ENCOUNTER — APPOINTMENT (OUTPATIENT)
Dept: LAB | Facility: HOSPITAL | Age: 37
End: 2017-09-19
Attending: OBSTETRICS & GYNECOLOGY
Payer: COMMERCIAL

## 2017-09-19 ENCOUNTER — TELEPHONE (OUTPATIENT)
Dept: OBGYN CLINIC | Facility: CLINIC | Age: 37
End: 2017-09-19

## 2017-09-19 DIAGNOSIS — Z34.03 ENCOUNTER FOR SUPERVISION OF NORMAL FIRST PREGNANCY IN THIRD TRIMESTER: ICD-10-CM

## 2017-09-19 DIAGNOSIS — Z34.03 ENCOUNTER FOR SUPERVISION OF NORMAL FIRST PREGNANCY IN THIRD TRIMESTER: Primary | ICD-10-CM

## 2017-09-19 PROCEDURE — 86780 TREPONEMA PALLIDUM: CPT

## 2017-09-19 PROCEDURE — 36415 COLL VENOUS BLD VENIPUNCTURE: CPT

## 2017-09-19 NOTE — TELEPHONE ENCOUNTER
LM with results and recommendations per pt's request. Pt instructed to call with further questions. Has appt scheduled for tomorrow.

## 2017-09-19 NOTE — TELEPHONE ENCOUNTER
Pt reassured that the inconclusive T pallidum result does not indicate that there is anything wrong. Pt verbalized understanding and states she will repeat the lab today. Pt informed the doctor can go over results at her PN appt tomorrow.

## 2017-09-19 NOTE — TELEPHONE ENCOUNTER
----- Message from Marlin Lafleur MD sent at 9/15/2017  1:22 PM CDT -----  CBC normal. Trep antibodies inconclusive.   Repeat in 1 wk

## 2017-09-20 ENCOUNTER — TELEPHONE (OUTPATIENT)
Dept: OBGYN CLINIC | Facility: CLINIC | Age: 37
End: 2017-09-20

## 2017-09-20 ENCOUNTER — APPOINTMENT (OUTPATIENT)
Dept: OBGYN CLINIC | Facility: HOSPITAL | Age: 37
End: 2017-09-20
Payer: COMMERCIAL

## 2017-09-20 ENCOUNTER — ROUTINE PRENATAL (OUTPATIENT)
Dept: OBGYN CLINIC | Facility: CLINIC | Age: 37
End: 2017-09-20

## 2017-09-20 ENCOUNTER — HOSPITAL ENCOUNTER (OUTPATIENT)
Facility: HOSPITAL | Age: 37
Discharge: HOME | End: 2017-09-20
Attending: OBSTETRICS & GYNECOLOGY | Admitting: OBSTETRICS & GYNECOLOGY
Payer: COMMERCIAL

## 2017-09-20 ENCOUNTER — ORDERS FOR ADMISSION (OUTPATIENT)
Dept: OBGYN CLINIC | Facility: CLINIC | Age: 37
End: 2017-09-20

## 2017-09-20 VITALS
DIASTOLIC BLOOD PRESSURE: 78 MMHG | WEIGHT: 159.38 LBS | BODY MASS INDEX: 30 KG/M2 | SYSTOLIC BLOOD PRESSURE: 115 MMHG | HEART RATE: 93 BPM

## 2017-09-20 DIAGNOSIS — Z34.83 ENCOUNTER FOR SUPERVISION OF OTHER NORMAL PREGNANCY IN THIRD TRIMESTER: Primary | ICD-10-CM

## 2017-09-20 LAB
APPEARANCE: CLEAR
MULTISTIX LOT#: ABNORMAL NUMERIC
PH, URINE: 6 (ref 4.5–8)
SPECIFIC GRAVITY: 1 (ref 1–1.03)
T PALLIDUM AB SER QL: NEGATIVE
URINE-COLOR: YELLOW
UROBILINOGEN,SEMI-QN: 0.2 MG/DL (ref 0–1.9)

## 2017-09-20 PROCEDURE — 59025 FETAL NON-STRESS TEST: CPT | Performed by: OBSTETRICS & GYNECOLOGY

## 2017-09-20 PROCEDURE — 59426 ANTEPARTUM CARE ONLY: CPT | Performed by: OBSTETRICS & GYNECOLOGY

## 2017-09-20 RX ORDER — TRISODIUM CITRATE DIHYDRATE AND CITRIC ACID MONOHYDRATE 500; 334 MG/5ML; MG/5ML
30 SOLUTION ORAL AS NEEDED
Status: CANCELLED | OUTPATIENT
Start: 2017-09-20

## 2017-09-20 RX ORDER — SODIUM CHLORIDE 0.9 % (FLUSH) 0.9 %
10 SYRINGE (ML) INJECTION AS NEEDED
Status: CANCELLED | OUTPATIENT
Start: 2017-09-20

## 2017-09-20 RX ORDER — AMMONIA INHALANTS 0.04 G/.3ML
0.3 INHALANT RESPIRATORY (INHALATION) AS NEEDED
Status: CANCELLED | OUTPATIENT
Start: 2017-09-20

## 2017-09-20 RX ORDER — TERBUTALINE SULFATE 1 MG/ML
0.25 INJECTION, SOLUTION SUBCUTANEOUS AS NEEDED
Status: CANCELLED | OUTPATIENT
Start: 2017-09-20

## 2017-09-20 RX ORDER — LIDOCAINE HYDROCHLORIDE 10 MG/ML
30 INJECTION, SOLUTION EPIDURAL; INFILTRATION; INTRACAUDAL; PERINEURAL ONCE
Status: CANCELLED | OUTPATIENT
Start: 2017-09-20 | End: 2017-09-20

## 2017-09-20 RX ORDER — IBUPROFEN 600 MG/1
600 TABLET ORAL ONCE AS NEEDED
Status: CANCELLED | OUTPATIENT
Start: 2017-09-20 | End: 2017-09-22

## 2017-09-20 RX ORDER — SODIUM CHLORIDE, SODIUM LACTATE, POTASSIUM CHLORIDE, CALCIUM CHLORIDE 600; 310; 30; 20 MG/100ML; MG/100ML; MG/100ML; MG/100ML
INJECTION, SOLUTION INTRAVENOUS CONTINUOUS
Status: CANCELLED | OUTPATIENT
Start: 2017-09-20

## 2017-09-20 NOTE — TELEPHONE ENCOUNTER
Pt notified the T Pallidum result is negative. Pt asking why the prior test was inconclusive. Pt informed that nurse is not sure. Nurse called lab and they could not say. Advised pt to discuss with the doctor at her PN appt this evening.  Pt verbalized unde

## 2017-09-20 NOTE — NST
Nonstress Test   Patient: Katelyn Willis    Gestation: 37w5d    NST:a2gdm     Variability: Moderate           Accelerations: Yes           Decelerations: None            Baseline: 130 BPM           Uterine Irritability: No           Contractions: Irregular

## 2017-09-21 ENCOUNTER — ANESTHESIA (OUTPATIENT)
Dept: OBGYN UNIT | Facility: HOSPITAL | Age: 37
End: 2017-09-21
Payer: COMMERCIAL

## 2017-09-21 ENCOUNTER — ANESTHESIA EVENT (OUTPATIENT)
Dept: OBGYN UNIT | Facility: HOSPITAL | Age: 37
End: 2017-09-21
Payer: COMMERCIAL

## 2017-09-21 ENCOUNTER — HOSPITAL ENCOUNTER (INPATIENT)
Facility: HOSPITAL | Age: 37
LOS: 2 days | Discharge: HOME | End: 2017-09-23
Attending: OBSTETRICS & GYNECOLOGY | Admitting: OBSTETRICS & GYNECOLOGY
Payer: COMMERCIAL

## 2017-09-21 PROBLEM — O24.419 GESTATIONAL DIABETES MELLITUS, CLASS A2 (HCC): Status: ACTIVE | Noted: 2017-09-21

## 2017-09-21 PROBLEM — O42.90 AMNIOTIC FLUID LEAKING (HCC): Status: ACTIVE | Noted: 2017-09-21

## 2017-09-21 PROBLEM — O24.419 GESTATIONAL DIABETES MELLITUS, CLASS A2: Status: ACTIVE | Noted: 2017-09-21

## 2017-09-21 PROBLEM — O42.90 AMNIOTIC FLUID LEAKING: Status: ACTIVE | Noted: 2017-09-21

## 2017-09-21 LAB
ANTIBODY SCREEN: NEGATIVE
ERYTHROCYTE [DISTWIDTH] IN BLOOD BY AUTOMATED COUNT: 13.7 % (ref 11–15)
GLUCOSE BLDC GLUCOMTR-MCNC: 71 MG/DL (ref 70–99)
GLUCOSE BLDC GLUCOMTR-MCNC: 82 MG/DL (ref 70–99)
GLUCOSE BLDC GLUCOMTR-MCNC: 86 MG/DL (ref 70–99)
GLUCOSE BLDC GLUCOMTR-MCNC: 88 MG/DL (ref 70–99)
HCT VFR BLD AUTO: 39.9 % (ref 35–48)
HGB BLD-MCNC: 13.5 G/DL (ref 12–16)
MCH RBC QN AUTO: 29.2 PG (ref 27–32)
MCHC RBC AUTO-ENTMCNC: 33.8 G/DL (ref 32–37)
MCV RBC AUTO: 86.2 FL (ref 80–100)
PLATELET # BLD AUTO: 234 K/UL (ref 140–400)
PMV BLD AUTO: 8.1 FL (ref 7.4–10.3)
RBC # BLD AUTO: 4.63 M/UL (ref 3.7–5.4)
RH BLOOD TYPE: POSITIVE
WBC # BLD AUTO: 6.2 K/UL (ref 4–11)

## 2017-09-21 PROCEDURE — 0KQM0ZZ REPAIR PERINEUM MUSCLE, OPEN APPROACH: ICD-10-PCS | Performed by: OBSTETRICS & GYNECOLOGY

## 2017-09-21 PROCEDURE — 99223 1ST HOSP IP/OBS HIGH 75: CPT | Performed by: OBSTETRICS & GYNECOLOGY

## 2017-09-21 PROCEDURE — 59410 OBSTETRICAL CARE: CPT | Performed by: OBSTETRICS & GYNECOLOGY

## 2017-09-21 RX ORDER — PRENATAL VIT,CAL 76/IRON/FOLIC 29 MG-1 MG
1 TABLET ORAL DAILY
Status: DISCONTINUED | OUTPATIENT
Start: 2017-09-21 | End: 2017-09-23

## 2017-09-21 RX ORDER — LIDOCAINE HYDROCHLORIDE 10 MG/ML
INJECTION, SOLUTION EPIDURAL; INFILTRATION; INTRACAUDAL; PERINEURAL AS NEEDED
Status: DISCONTINUED | OUTPATIENT
Start: 2017-09-21 | End: 2017-09-21 | Stop reason: SURG

## 2017-09-21 RX ORDER — EPHEDRINE SULFATE/0.9% NACL/PF 25 MG/5 ML
SYRINGE (ML) INTRAVENOUS
Status: DISCONTINUED
Start: 2017-09-21 | End: 2017-09-21 | Stop reason: WASHOUT

## 2017-09-21 RX ORDER — ONDANSETRON 2 MG/ML
4 INJECTION INTRAMUSCULAR; INTRAVENOUS EVERY 6 HOURS PRN
Status: DISCONTINUED | OUTPATIENT
Start: 2017-09-21 | End: 2017-09-23

## 2017-09-21 RX ORDER — DEXTROSE, SODIUM CHLORIDE, SODIUM LACTATE, POTASSIUM CHLORIDE, AND CALCIUM CHLORIDE 5; .6; .31; .03; .02 G/100ML; G/100ML; G/100ML; G/100ML; G/100ML
125 INJECTION, SOLUTION INTRAVENOUS CONTINUOUS
Status: DISCONTINUED | OUTPATIENT
Start: 2017-09-21 | End: 2017-09-21 | Stop reason: HOSPADM

## 2017-09-21 RX ORDER — BUPIVACAINE HYDROCHLORIDE 2.5 MG/ML
INJECTION, SOLUTION EPIDURAL; INFILTRATION; INTRACAUDAL AS NEEDED
Status: DISCONTINUED | OUTPATIENT
Start: 2017-09-21 | End: 2017-09-21 | Stop reason: SURG

## 2017-09-21 RX ORDER — TRISODIUM CITRATE DIHYDRATE AND CITRIC ACID MONOHYDRATE 500; 334 MG/5ML; MG/5ML
30 SOLUTION ORAL AS NEEDED
Status: DISCONTINUED | OUTPATIENT
Start: 2017-09-21 | End: 2017-09-23

## 2017-09-21 RX ORDER — LIDOCAINE HYDROCHLORIDE 10 MG/ML
30 INJECTION, SOLUTION EPIDURAL; INFILTRATION; INTRACAUDAL; PERINEURAL ONCE
Status: DISCONTINUED | OUTPATIENT
Start: 2017-09-21 | End: 2017-09-21 | Stop reason: HOSPADM

## 2017-09-21 RX ORDER — AMMONIA INHALANTS 0.04 G/.3ML
0.3 INHALANT RESPIRATORY (INHALATION) AS NEEDED
Status: DISCONTINUED | OUTPATIENT
Start: 2017-09-21 | End: 2017-09-21

## 2017-09-21 RX ORDER — EPHEDRINE SULFATE/0.9% NACL/PF 25 MG/5 ML
5 SYRINGE (ML) INTRAVENOUS AS NEEDED
Status: DISCONTINUED | OUTPATIENT
Start: 2017-09-21 | End: 2017-09-23

## 2017-09-21 RX ORDER — LIDOCAINE HYDROCHLORIDE 10 MG/ML
30 INJECTION, SOLUTION EPIDURAL; INFILTRATION; INTRACAUDAL; PERINEURAL ONCE
Status: DISCONTINUED | OUTPATIENT
Start: 2017-09-21 | End: 2017-09-21

## 2017-09-21 RX ORDER — DIAPER,BRIEF,INFANT-TODD,DISP
1 EACH MISCELLANEOUS EVERY 6 HOURS PRN
Status: DISCONTINUED | OUTPATIENT
Start: 2017-09-21 | End: 2017-09-23

## 2017-09-21 RX ORDER — LIDOCAINE HYDROCHLORIDE AND EPINEPHRINE 15; 5 MG/ML; UG/ML
INJECTION, SOLUTION EPIDURAL AS NEEDED
Status: DISCONTINUED | OUTPATIENT
Start: 2017-09-21 | End: 2017-09-21 | Stop reason: SURG

## 2017-09-21 RX ORDER — AMMONIA INHALANTS 0.04 G/.3ML
0.3 INHALANT RESPIRATORY (INHALATION) AS NEEDED
Status: DISCONTINUED | OUTPATIENT
Start: 2017-09-21 | End: 2017-09-23

## 2017-09-21 RX ORDER — NALBUPHINE HCL 10 MG/ML
2.5 AMPUL (ML) INJECTION
Status: DISCONTINUED | OUTPATIENT
Start: 2017-09-21 | End: 2017-09-23

## 2017-09-21 RX ORDER — LIDOCAINE HYDROCHLORIDE AND EPINEPHRINE 20; 5 MG/ML; UG/ML
INJECTION, SOLUTION EPIDURAL; INFILTRATION; INTRACAUDAL; PERINEURAL
Status: DISCONTINUED
Start: 2017-09-21 | End: 2017-09-21 | Stop reason: WASHOUT

## 2017-09-21 RX ORDER — SODIUM CHLORIDE 0.9 % (FLUSH) 0.9 %
10 SYRINGE (ML) INJECTION AS NEEDED
Status: DISCONTINUED | OUTPATIENT
Start: 2017-09-21 | End: 2017-09-23

## 2017-09-21 RX ORDER — TERBUTALINE SULFATE 1 MG/ML
0.25 INJECTION, SOLUTION SUBCUTANEOUS AS NEEDED
Status: DISCONTINUED | OUTPATIENT
Start: 2017-09-21 | End: 2017-09-23

## 2017-09-21 RX ORDER — BUPIVACAINE HYDROCHLORIDE 2.5 MG/ML
INJECTION, SOLUTION EPIDURAL; INFILTRATION; INTRACAUDAL
Status: COMPLETED
Start: 2017-09-21 | End: 2017-09-21

## 2017-09-21 RX ORDER — TERBUTALINE SULFATE 1 MG/ML
0.25 INJECTION, SOLUTION SUBCUTANEOUS AS NEEDED
Status: DISCONTINUED | OUTPATIENT
Start: 2017-09-21 | End: 2017-09-21 | Stop reason: HOSPADM

## 2017-09-21 RX ORDER — SODIUM CHLORIDE, SODIUM LACTATE, POTASSIUM CHLORIDE, CALCIUM CHLORIDE 600; 310; 30; 20 MG/100ML; MG/100ML; MG/100ML; MG/100ML
INJECTION, SOLUTION INTRAVENOUS
Status: COMPLETED
Start: 2017-09-21 | End: 2017-09-21

## 2017-09-21 RX ORDER — IBUPROFEN 600 MG/1
600 TABLET ORAL EVERY 6 HOURS PRN
Status: DISCONTINUED | OUTPATIENT
Start: 2017-09-21 | End: 2017-09-23

## 2017-09-21 RX ORDER — ONDANSETRON 2 MG/ML
4 INJECTION INTRAMUSCULAR; INTRAVENOUS EVERY 6 HOURS PRN
Status: DISCONTINUED | OUTPATIENT
Start: 2017-09-21 | End: 2017-09-21 | Stop reason: HOSPADM

## 2017-09-21 RX ORDER — DOCUSATE SODIUM 100 MG/1
100 CAPSULE, LIQUID FILLED ORAL 2 TIMES DAILY
Status: DISCONTINUED | OUTPATIENT
Start: 2017-09-21 | End: 2017-09-23

## 2017-09-21 RX ORDER — CHOLECALCIFEROL (VITAMIN D3) 25 MCG
1 TABLET,CHEWABLE ORAL DAILY
Status: DISCONTINUED | OUTPATIENT
Start: 2017-09-21 | End: 2017-09-21

## 2017-09-21 RX ORDER — PHENYLEPHRINE HCL IN 0.9% NACL 0.5 MG/5ML
SYRINGE (ML) INTRAVENOUS
Status: DISCONTINUED
Start: 2017-09-21 | End: 2017-09-21 | Stop reason: WASHOUT

## 2017-09-21 RX ORDER — AMMONIA INHALANTS 0.04 G/.3ML
0.3 INHALANT RESPIRATORY (INHALATION) AS NEEDED
Status: DISCONTINUED | OUTPATIENT
Start: 2017-09-21 | End: 2017-09-21 | Stop reason: HOSPADM

## 2017-09-21 RX ORDER — SIMETHICONE 80 MG
80 TABLET,CHEWABLE ORAL 3 TIMES DAILY PRN
Status: DISCONTINUED | OUTPATIENT
Start: 2017-09-21 | End: 2017-09-23

## 2017-09-21 RX ORDER — HYDROCODONE BITARTRATE AND ACETAMINOPHEN 5; 325 MG/1; MG/1
1 TABLET ORAL EVERY 6 HOURS PRN
Status: DISCONTINUED | OUTPATIENT
Start: 2017-09-21 | End: 2017-09-23

## 2017-09-21 RX ORDER — IBUPROFEN 600 MG/1
600 TABLET ORAL ONCE AS NEEDED
Status: DISCONTINUED | OUTPATIENT
Start: 2017-09-21 | End: 2017-09-21 | Stop reason: HOSPADM

## 2017-09-21 RX ORDER — SODIUM CHLORIDE, SODIUM LACTATE, POTASSIUM CHLORIDE, CALCIUM CHLORIDE 600; 310; 30; 20 MG/100ML; MG/100ML; MG/100ML; MG/100ML
INJECTION, SOLUTION INTRAVENOUS CONTINUOUS
Status: DISCONTINUED | OUTPATIENT
Start: 2017-09-21 | End: 2017-09-21 | Stop reason: HOSPADM

## 2017-09-21 RX ORDER — SODIUM CHLORIDE, SODIUM LACTATE, POTASSIUM CHLORIDE, CALCIUM CHLORIDE 600; 310; 30; 20 MG/100ML; MG/100ML; MG/100ML; MG/100ML
INJECTION, SOLUTION INTRAVENOUS CONTINUOUS
Status: DISCONTINUED | OUTPATIENT
Start: 2017-09-21 | End: 2017-09-21

## 2017-09-21 RX ORDER — BISACODYL 10 MG
10 SUPPOSITORY, RECTAL RECTAL ONCE AS NEEDED
Status: DISCONTINUED | OUTPATIENT
Start: 2017-09-21 | End: 2017-09-23

## 2017-09-21 RX ADMIN — LIDOCAINE HYDROCHLORIDE 1 ML: 10 INJECTION, SOLUTION EPIDURAL; INFILTRATION; INTRACAUDAL; PERINEURAL at 06:18:00

## 2017-09-21 RX ADMIN — LIDOCAINE HYDROCHLORIDE AND EPINEPHRINE 5 ML: 15; 5 INJECTION, SOLUTION EPIDURAL at 06:19:00

## 2017-09-21 RX ADMIN — BUPIVACAINE HYDROCHLORIDE 10 ML: 2.5 INJECTION, SOLUTION EPIDURAL; INFILTRATION; INTRACAUDAL at 06:20:00

## 2017-09-21 NOTE — DISCHARGE SUMMARY
[facility]    Discharge Summary    Riddhiarlene Singh Patient Status:  Inpatient    1980 MRN [id number]   Location [address]  Attending Kristal Alexandre MD   Hosp Day # 0       Admit date:  2017    Disch

## 2017-09-21 NOTE — PLAN OF CARE
PAIN - ADULT    • Verbalizes/displays adequate comfort level or patient's stated pain goal Progressing        Patient Centered Care    • Patient preferences are identified and integrated in the patient's plan of care Progressing

## 2017-09-21 NOTE — ANESTHESIA POSTPROCEDURE EVALUATION
Patient: Nathan Escobedo    Procedure Summary     Date:  09/21/17 Room / Location:      Anesthesia Start:  0618 Anesthesia Stop:  0155    Procedure:  LABOR ANALGESIA Diagnosis:      Scheduled Providers:   Anesthesiologist:  Noe Hubbard MD    Anesthesia Ty

## 2017-09-21 NOTE — ANESTHESIA PROCEDURE NOTES
Labor Analgesia  Performed by: Linda Guzman  Authorized by: Linda Guzman     Patient Location:  OB  Start Time:  9/21/2017 6:18 AM  End Time:  9/21/2017 6:27 AM  Site Identification: surface landmarks    Reason for Block: labor epidural    Anesthesiologi

## 2017-09-21 NOTE — ANESTHESIA PREPROCEDURE EVALUATION
Anesthesia PreOp Note    HPI:     Addie Anthony is a 31year old female who presents for preoperative consultation requested by: * No surgeons listed *    Date of Surgery:     * No procedures listed *  Indication: * No pre-op diagnosis entered *      Histo daily. Disp: 1 each Rfl: 1 Taking   FREESTYLE LANCETS Does not apply Misc 1 each by Finger stick route 4 (four) times daily. Use as directed.  Disp: 1 Box Rfl: 3 Taking       Current Facility-Administered Medications Ordered in Epic:  lidocaine-EPINEPHrine History   Marital status:   Spouse name: N/A    Years of education: N/A  Number of children: N/A     Occupational History  None on file     Social History Main Topics   Smoking status: Never Smoker    Smokeless tobacco: Never Used    Alcohol use Yes

## 2017-09-21 NOTE — H&P
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 Use as directed.  Disp: 1 Box Rfl: 3 Taking         Review of Systems:   As documented in HPI      Physical Exam:   Temp:  [97.5 °F (36.4 °C)-97.6 °F (36.4 °C)] 97.5 °F (36.4 °C)  Pulse:  [] 75  Resp:  [16-18] 17  BP: ()/() 92/55 reactive     Amniotic fluid leaking      Treatment Plan:    Expectant management., Anticipate vaginal delivery.  and check sugars every hour since in active labor, Amp for (+) GBS      Risks, benefits, alternatives and possible complications have been discu

## 2017-09-21 NOTE — PROGRESS NOTES
Cervix soft and mid giving [facility] = 7. Plan IOL most likely 09-26 AM since schedule is full on 09-25. Last NST was yesterday.

## 2017-09-22 LAB
BASOPHILS # BLD: 0 K/UL (ref 0–0.2)
BASOPHILS NFR BLD: 1 %
EOSINOPHIL # BLD: 0.1 K/UL (ref 0–0.7)
EOSINOPHIL NFR BLD: 1 %
ERYTHROCYTE [DISTWIDTH] IN BLOOD BY AUTOMATED COUNT: 13.8 % (ref 11–15)
GLUCOSE BLDC GLUCOMTR-MCNC: 69 MG/DL (ref 70–99)
HCT VFR BLD AUTO: 36.3 % (ref 35–48)
HGB BLD-MCNC: 12 G/DL (ref 12–16)
LYMPHOCYTES # BLD: 1.1 K/UL (ref 1–4)
LYMPHOCYTES NFR BLD: 17 %
MCH RBC QN AUTO: 29.2 PG (ref 27–32)
MCHC RBC AUTO-ENTMCNC: 33.1 G/DL (ref 32–37)
MCV RBC AUTO: 88.3 FL (ref 80–100)
MONOCYTES # BLD: 0.4 K/UL (ref 0–1)
MONOCYTES NFR BLD: 6 %
NEUTROPHILS # BLD AUTO: 5.2 K/UL (ref 1.8–7.7)
NEUTROPHILS NFR BLD: 76 %
PLATELET # BLD AUTO: 199 K/UL (ref 140–400)
PMV BLD AUTO: 8.4 FL (ref 7.4–10.3)
RBC # BLD AUTO: 4.1 M/UL (ref 3.7–5.4)
WBC # BLD AUTO: 6.8 K/UL (ref 4–11)

## 2017-09-22 NOTE — PLAN OF CARE
PAIN - ADULT    • Verbalizes/displays adequate comfort level or patient's stated pain goal Progressing        POSTPARTUM    • Long Term Goal:Experiences normal postpartum course Progressing    • Optimize infant feeding at the breast Progressing    • Establ

## 2017-09-22 NOTE — PROGRESS NOTES
Post-Partum Note   9/22/2017, 8:53 AM    Subjective:  Patient doing well. Pain mild. Lochia is small. She reports she does tolerate a regular diet. She denies headache, fever, chest pain, shortness of breath, and leg pain.   She has ambulated and denies

## 2017-09-23 VITALS
DIASTOLIC BLOOD PRESSURE: 74 MMHG | OXYGEN SATURATION: 100 % | SYSTOLIC BLOOD PRESSURE: 108 MMHG | HEART RATE: 93 BPM | RESPIRATION RATE: 16 BRPM | TEMPERATURE: 98 F

## 2017-09-23 RX ORDER — IBUPROFEN 600 MG/1
600 TABLET ORAL EVERY 6 HOURS PRN
Qty: 30 TABLET | Refills: 0 | Status: SHIPPED | OUTPATIENT
Start: 2017-09-23 | End: 2017-11-11

## 2017-09-23 RX ORDER — HYDROCODONE BITARTRATE AND ACETAMINOPHEN 5; 325 MG/1; MG/1
1 TABLET ORAL EVERY 6 HOURS PRN
Qty: 10 TABLET | Refills: 0 | Status: SHIPPED | OUTPATIENT
Start: 2017-09-23 | End: 2017-11-11

## 2017-09-23 NOTE — PROGRESS NOTES
DISCHARGE ORDER RECEIVED FROM MD. POSTPARTUM DISCHARGE FOLDER GIVEN. DISCHARGE MEDICATION FORM REVIEWED, GIVEN TO THE PATIENT. ID BANDS MATCHED WITH BABY BAND.  PATIENT INFORMED WHEN TO MAKE FOLLOW-UP APPOINTMENT WITH OB.    MOTHER INTERACTING WITH BABY RICARDO

## 2017-09-23 NOTE — PROGRESS NOTES
[facility]    Post  Progress Note      Rosie Hutson Patient Status:  Inpatient    1980 MRN [id number]   Location [facility] [address] Attending Amandeep Morocho MD   Hosp Day # 2 PCP Emmanuel Yeh MD       Subjective     Good

## 2017-09-23 NOTE — PLAN OF CARE
PAIN - ADULT    • Verbalizes/displays adequate comfort level or patient's stated pain goal Completed        Patient Centered Care    • Patient preferences are identified and integrated in the patient's plan of care Completed        POSTPARTUM    • Long Ter

## 2017-11-11 ENCOUNTER — POSTPARTUM (OUTPATIENT)
Dept: OBGYN CLINIC | Facility: CLINIC | Age: 37
End: 2017-11-11

## 2017-11-11 VITALS
DIASTOLIC BLOOD PRESSURE: 75 MMHG | HEART RATE: 75 BPM | BODY MASS INDEX: 26 KG/M2 | WEIGHT: 139.19 LBS | SYSTOLIC BLOOD PRESSURE: 110 MMHG

## 2017-11-11 PROBLEM — O24.419 GESTATIONAL DIABETES (HCC): Status: RESOLVED | Noted: 2017-07-23 | Resolved: 2017-11-11

## 2017-11-11 PROBLEM — Z87.59 HISTORY OF SHOULDER DYSTOCIA IN PRIOR PREGNANCY: Status: RESOLVED | Noted: 2017-03-02 | Resolved: 2017-11-11

## 2017-11-11 PROBLEM — O24.419 GESTATIONAL DIABETES: Status: RESOLVED | Noted: 2017-07-23 | Resolved: 2017-11-11

## 2017-11-11 PROBLEM — O60.00 PRETERM LABOR (HCC): Status: RESOLVED | Noted: 2017-08-15 | Resolved: 2017-11-11

## 2017-11-11 PROBLEM — O60.00 PRETERM LABOR: Status: RESOLVED | Noted: 2017-08-15 | Resolved: 2017-11-11

## 2017-11-11 NOTE — PROGRESS NOTES
Clau Terrell is a 31year old female [id number] here for 6 week post-partum visit. Patient delivered a  female infant on 17 via . Patient desires condoms for contraception. Patient is bottle feeding.    Patient denies symptoms of depression, Ferdinand 110/75   Pulse 75   Wt 139 lb 3.2 oz (63.1 kg)   LMP 11/09/2017   Breastfeeding?  No   BMI 26.30 kg/m²   General:    well nourished, well developed woman in no acute distress  Abdomen:    soft, nontender, no masses  External Genitalia:  normal appearance, h

## 2018-01-23 ENCOUNTER — OFFICE VISIT (OUTPATIENT)
Dept: OBGYN CLINIC | Facility: CLINIC | Age: 38
End: 2018-01-23

## 2018-01-23 ENCOUNTER — APPOINTMENT (OUTPATIENT)
Dept: LAB | Facility: HOSPITAL | Age: 38
End: 2018-01-23
Attending: OBSTETRICS & GYNECOLOGY
Payer: COMMERCIAL

## 2018-01-23 VITALS
WEIGHT: 145 LBS | HEART RATE: 88 BPM | SYSTOLIC BLOOD PRESSURE: 107 MMHG | BODY MASS INDEX: 27 KG/M2 | DIASTOLIC BLOOD PRESSURE: 73 MMHG

## 2018-01-23 DIAGNOSIS — N92.1 MENORRHAGIA WITH IRREGULAR CYCLE: Primary | ICD-10-CM

## 2018-01-23 DIAGNOSIS — N92.1 MENORRHAGIA WITH IRREGULAR CYCLE: ICD-10-CM

## 2018-01-23 LAB
ERYTHROCYTE [DISTWIDTH] IN BLOOD BY AUTOMATED COUNT: 12.5 % (ref 11–15)
HCT VFR BLD AUTO: 35.8 % (ref 35–48)
HGB BLD-MCNC: 12.1 G/DL (ref 12–16)
MCH RBC QN AUTO: 30.1 PG (ref 27–32)
MCHC RBC AUTO-ENTMCNC: 33.6 G/DL (ref 32–37)
MCV RBC AUTO: 89.5 FL (ref 80–100)
PLATELET # BLD AUTO: 296 K/UL (ref 140–400)
PMV BLD AUTO: 8.4 FL (ref 7.4–10.3)
RBC # BLD AUTO: 4.01 M/UL (ref 3.7–5.4)
WBC # BLD AUTO: 6.6 K/UL (ref 4–11)

## 2018-01-23 PROCEDURE — 99213 OFFICE O/P EST LOW 20 MIN: CPT | Performed by: OBSTETRICS & GYNECOLOGY

## 2018-01-23 PROCEDURE — 36415 COLL VENOUS BLD VENIPUNCTURE: CPT

## 2018-01-23 PROCEDURE — 85027 COMPLETE CBC AUTOMATED: CPT

## 2018-01-24 ENCOUNTER — HOSPITAL ENCOUNTER (OUTPATIENT)
Dept: ULTRASOUND IMAGING | Facility: HOSPITAL | Age: 38
Discharge: HOME | End: 2018-01-24
Attending: OBSTETRICS & GYNECOLOGY
Payer: COMMERCIAL

## 2018-01-24 ENCOUNTER — TELEPHONE (OUTPATIENT)
Dept: OBGYN CLINIC | Facility: CLINIC | Age: 38
End: 2018-01-24

## 2018-01-24 DIAGNOSIS — N92.1 MENORRHAGIA WITH IRREGULAR CYCLE: ICD-10-CM

## 2018-01-24 PROCEDURE — 76830 TRANSVAGINAL US NON-OB: CPT | Performed by: OBSTETRICS & GYNECOLOGY

## 2018-01-24 PROCEDURE — 76856 US EXAM PELVIC COMPLETE: CPT | Performed by: OBSTETRICS & GYNECOLOGY

## 2018-01-24 NOTE — TELEPHONE ENCOUNTER
The pt is returning a nurse's call, and states that a detailed v/m can be left at [phone]. Please advise.

## 2018-01-24 NOTE — PROGRESS NOTES
Mateus Byrd is a 30year old female [id number] Patient's last menstrual period was 01/18/2018. Patient presents with:  Gyn Problem: heavy periods w/clots q 18 days/ vaginal pressure  Patient presents today of heavy menstrual cycle.  She has had three cycles prescriptions on file.     ALLERGIES:  No Known Allergies      Review of Systems:  Constitutional:  Denies fevers or chills   Cardiovascular:  denies chest pain or palpitations  Respiratory:  denies shortness of breath  Gastrointestinal:  denies nausea, vom

## 2018-01-24 NOTE — TELEPHONE ENCOUNTER
----- Message from Henrry Henao MD sent at 1/24/2018  8:16 AM CST -----  Please let patient know that she is not anemic but if continues to have heavy menses should be taking Iron to help.

## 2018-01-27 ENCOUNTER — LAB ENCOUNTER (OUTPATIENT)
Dept: LAB | Facility: HOSPITAL | Age: 38
End: 2018-01-27
Attending: OBSTETRICS & GYNECOLOGY
Payer: COMMERCIAL

## 2018-01-27 LAB
GLUCOSE 1H P GLC SERPL-MCNC: 205 MG/DL
GLUCOSE 2H P GLC SERPL-MCNC: 80 MG/DL
GLUCOSE P FAST SERPL-MCNC: 95 MG/DL (ref 70–99)

## 2018-01-27 PROCEDURE — 36415 COLL VENOUS BLD VENIPUNCTURE: CPT

## 2018-01-27 PROCEDURE — 82951 GLUCOSE TOLERANCE TEST (GTT): CPT

## 2018-01-29 LAB — GLUCOSE BLDC GLUCOMTR-MCNC: 86 MG/DL (ref 70–99)

## 2018-01-31 ENCOUNTER — TELEPHONE (OUTPATIENT)
Dept: OBGYN CLINIC | Facility: CLINIC | Age: 38
End: 2018-01-31

## 2018-01-31 NOTE — TELEPHONE ENCOUNTER
Called patient to review US results. Questionable 1.2cm polyp vs fibroid. Reviewed options of IUD, OCPs, Endosee vs Hysteroscopy D&C. Pt still leaning towards ablation. Pt would like to discuss with her  and think about her options.  She will make an

## 2018-04-12 ENCOUNTER — OFFICE VISIT (OUTPATIENT)
Dept: OBGYN CLINIC | Facility: CLINIC | Age: 38
End: 2018-04-12

## 2018-04-12 ENCOUNTER — TELEPHONE (OUTPATIENT)
Dept: OBGYN CLINIC | Facility: CLINIC | Age: 38
End: 2018-04-12

## 2018-04-12 VITALS
WEIGHT: 143 LBS | HEART RATE: 91 BPM | SYSTOLIC BLOOD PRESSURE: 107 MMHG | DIASTOLIC BLOOD PRESSURE: 76 MMHG | HEIGHT: 62.4 IN | BODY MASS INDEX: 25.98 KG/M2

## 2018-04-12 DIAGNOSIS — Z30.09 STERILIZATION CONSULT: ICD-10-CM

## 2018-04-12 DIAGNOSIS — Z30.2 REQUEST FOR STERILIZATION: Primary | ICD-10-CM

## 2018-04-12 DIAGNOSIS — Z01.419 ENCOUNTER FOR GYNECOLOGICAL EXAMINATION WITHOUT ABNORMAL FINDING: Primary | ICD-10-CM

## 2018-04-12 DIAGNOSIS — Z01.818 PRE-OP TESTING: ICD-10-CM

## 2018-04-12 DIAGNOSIS — N92.0 MENORRHAGIA WITH REGULAR CYCLE: ICD-10-CM

## 2018-04-12 PROCEDURE — 99213 OFFICE O/P EST LOW 20 MIN: CPT | Performed by: OBSTETRICS & GYNECOLOGY

## 2018-04-12 PROCEDURE — 99395 PREV VISIT EST AGE 18-39: CPT | Performed by: OBSTETRICS & GYNECOLOGY

## 2018-04-12 RX ORDER — AZELAIC ACID 0.15 G/G
AEROSOL, FOAM TOPICAL
COMMUNITY
Start: 2018-02-08 | End: 2019-11-12

## 2018-04-13 NOTE — PROGRESS NOTES
Well Woman Exam    HPI:  The patient is a 31yo female who presents today for annual exam. She continues to have heavy menstrual bleeding. She states she changes her pad about every hour for the first two days.  She does not want contraceptive method to make External Foam, , Disp: , Rfl:     ALLERGIES:  No Known Allergies      Review of Systems:  Constitutional:  Denies fevers and chills   Cardiovascular:  denies chest pain or palpitations  Respiratory:  denies shortness of breath  Gastrointestinal:  denies na mammograms at age 38; however, ACOG encourages shared decision making with the patient and together we reach appropriate screening intervals for the individual  2. Reviewed breast self awareness   4. Heavy Menstrual Bleeding  1.  Reviewed options for heavy difficulty  Or complication procedures may be abandoned and/or converted to laparotomy. She is aware that bilateral salpingectomy is now being offered due to data suggesting decreased risk of ovarian cancer.    2. Plan is for bilateral salpingectomy- review

## 2018-04-13 NOTE — TELEPHONE ENCOUNTER
OB GYN SURGICAL SCHEDULING    Diagnosis: heavy menstrual bleeding and desires sterilization     Operative Procedure:  Laparoscopic bilateral salpingectomy and hysteroscopy with Novasure ablation (possible polypectomy/myomectomy with Myosure)    Side: bilat

## 2018-04-16 NOTE — TELEPHONE ENCOUNTER
Called [facility] johanne bennett spoke to [place].  And she confirmed cpt [id number] does not require PA for diagnosis codes Z30.2/N92.0 REF# [id number]

## 2018-04-16 NOTE — TELEPHONE ENCOUNTER
Orders placed for TSH, CBC, and hcg qual per [facility] surgery order below.

## 2018-04-16 NOTE — TELEPHONE ENCOUNTER
Lmtcb. Please relay information:     Patient is scheduled 6/6/18 1:30pm LBS/hysteroscopy /ROBERTO Rasmussen orders routed. Instructions routed via [employer]. Email routed request myosure rep 4/16/18.

## 2018-04-24 NOTE — TELEPHONE ENCOUNTER
PT NOTIFIED OF SURGERY DATE AND TIME. SHE IS AWARE WE WILL CALL HER ABOUT 1 WEEK PRIOR TO SURGERY TO REVIEW INSTRUCTIONS AND ANSWER ANY QUESTIONS. PT IS AWARE HER PRE-OP INSTRUCTIONS AND ANTIMICROBIAL [facility] INSTRUCTIONS ARE IN MY CHART.   SHE WILL REVIEW B

## 2018-06-05 ENCOUNTER — LAB ENCOUNTER (OUTPATIENT)
Dept: LAB | Facility: HOSPITAL | Age: 38
End: 2018-06-05
Attending: OBSTETRICS & GYNECOLOGY
Payer: COMMERCIAL

## 2018-06-05 DIAGNOSIS — Z01.818 PRE-OP TESTING: ICD-10-CM

## 2018-06-05 PROCEDURE — 36415 COLL VENOUS BLD VENIPUNCTURE: CPT

## 2018-06-05 PROCEDURE — 84443 ASSAY THYROID STIM HORMONE: CPT

## 2018-06-05 PROCEDURE — 84703 CHORIONIC GONADOTROPIN ASSAY: CPT

## 2018-06-05 PROCEDURE — 85025 COMPLETE CBC W/AUTO DIFF WBC: CPT

## 2018-06-05 NOTE — H&P
Rolando 40 Patient Status:  Hospital Outpatient Surgery    1980 MRN [id number]   [place] [address] Attending Kodak Virgen MD   Hosp Day # 0 PCP Destiny Collins Status    [id number] 6/6/18 LAPAROSCOPIC SALPINGECTOMY Mitesh Maradiaga MD [facility] MAIN OR [facility]        Family History:   Family History   Problem Relation Age of Onset   • Cancer Father    • Lipids Father    • Hypertension Father    • Lipids Mother    • Hypertensi Negative   -CBC W/ DIFFERENTIAL   Result Value Ref Range   WBC 4.7 4.0 - 11.0 K/UL   RBC 4.44 3.70 - 5.40 M/UL   HGB 12.7 12.0 - 16.0 g/dL   HCT 37.7 35.0 - 48.0 %   MCV 84.9 80.0 - 100.0 fL   MCH 28.6 27.0 - 32.0 pg   MCHC 33.7 32.0 - 37.0 g/dl   RDW 14.2

## 2018-06-06 ENCOUNTER — ANESTHESIA EVENT (OUTPATIENT)
Dept: SURGERY | Facility: HOSPITAL | Age: 38
End: 2018-06-06
Payer: COMMERCIAL

## 2018-06-06 ENCOUNTER — ANESTHESIA (OUTPATIENT)
Dept: SURGERY | Facility: HOSPITAL | Age: 38
End: 2018-06-06
Payer: COMMERCIAL

## 2018-06-06 ENCOUNTER — SURGERY (OUTPATIENT)
Age: 38
End: 2018-06-06

## 2018-06-06 ENCOUNTER — HOSPITAL ENCOUNTER (OUTPATIENT)
Facility: HOSPITAL | Age: 38
Setting detail: HOSPITAL OUTPATIENT SURGERY
Discharge: HOME | End: 2018-06-06
Attending: OBSTETRICS & GYNECOLOGY | Admitting: OBSTETRICS & GYNECOLOGY
Payer: COMMERCIAL

## 2018-06-06 VITALS
HEART RATE: 64 BPM | HEIGHT: 61 IN | RESPIRATION RATE: 18 BRPM | SYSTOLIC BLOOD PRESSURE: 108 MMHG | TEMPERATURE: 98 F | WEIGHT: 140.19 LBS | BODY MASS INDEX: 26.47 KG/M2 | DIASTOLIC BLOOD PRESSURE: 72 MMHG | OXYGEN SATURATION: 100 %

## 2018-06-06 DIAGNOSIS — Z30.2 REQUEST FOR STERILIZATION: ICD-10-CM

## 2018-06-06 DIAGNOSIS — N92.0 MENORRHAGIA WITH REGULAR CYCLE: ICD-10-CM

## 2018-06-06 PROCEDURE — 58670 LAPAROSCOPY TUBAL CAUTERY: CPT | Performed by: OBSTETRICS & GYNECOLOGY

## 2018-06-06 PROCEDURE — 0U5B8ZZ DESTRUCTION OF ENDOMETRIUM, VIA NATURAL OR ARTIFICIAL OPENING ENDOSCOPIC: ICD-10-PCS | Performed by: OBSTETRICS & GYNECOLOGY

## 2018-06-06 PROCEDURE — 58563 HYSTEROSCOPY ABLATION: CPT | Performed by: OBSTETRICS & GYNECOLOGY

## 2018-06-06 PROCEDURE — 0UT74ZZ RESECTION OF BILATERAL FALLOPIAN TUBES, PERCUTANEOUS ENDOSCOPIC APPROACH: ICD-10-PCS | Performed by: OBSTETRICS & GYNECOLOGY

## 2018-06-06 RX ORDER — ONDANSETRON 2 MG/ML
4 INJECTION INTRAMUSCULAR; INTRAVENOUS EVERY 8 HOURS PRN
Status: CANCELLED | OUTPATIENT
Start: 2018-06-06

## 2018-06-06 RX ORDER — HYDROMORPHONE HYDROCHLORIDE 1 MG/ML
0.2 INJECTION, SOLUTION INTRAMUSCULAR; INTRAVENOUS; SUBCUTANEOUS EVERY 5 MIN PRN
Status: DISCONTINUED | OUTPATIENT
Start: 2018-06-06 | End: 2018-06-06

## 2018-06-06 RX ORDER — SODIUM CHLORIDE, SODIUM LACTATE, POTASSIUM CHLORIDE, CALCIUM CHLORIDE 600; 310; 30; 20 MG/100ML; MG/100ML; MG/100ML; MG/100ML
INJECTION, SOLUTION INTRAVENOUS CONTINUOUS
Status: DISCONTINUED | OUTPATIENT
Start: 2018-06-06 | End: 2018-06-06

## 2018-06-06 RX ORDER — HYDROCODONE BITARTRATE AND ACETAMINOPHEN 5; 325 MG/1; MG/1
1 TABLET ORAL EVERY 4 HOURS PRN
Status: CANCELLED | OUTPATIENT
Start: 2018-06-06

## 2018-06-06 RX ORDER — METOCLOPRAMIDE 10 MG/1
10 TABLET ORAL ONCE
Status: DISCONTINUED | OUTPATIENT
Start: 2018-06-06 | End: 2018-06-06 | Stop reason: HOSPADM

## 2018-06-06 RX ORDER — HYDROCODONE BITARTRATE AND ACETAMINOPHEN 5; 325 MG/1; MG/1
1-2 TABLET ORAL EVERY 6 HOURS PRN
Status: DISCONTINUED | OUTPATIENT
Start: 2018-06-06 | End: 2018-06-06

## 2018-06-06 RX ORDER — SCOLOPAMINE TRANSDERMAL SYSTEM 1 MG/1
1 PATCH, EXTENDED RELEASE TRANSDERMAL
Status: DISCONTINUED | OUTPATIENT
Start: 2018-06-06 | End: 2018-06-06

## 2018-06-06 RX ORDER — SODIUM CHLORIDE 0.9 % (FLUSH) 0.9 %
10 SYRINGE (ML) INJECTION AS NEEDED
Status: DISCONTINUED | OUTPATIENT
Start: 2018-06-06 | End: 2018-06-06 | Stop reason: HOSPADM

## 2018-06-06 RX ORDER — HALOPERIDOL 5 MG/ML
0.25 INJECTION INTRAMUSCULAR ONCE AS NEEDED
Status: DISCONTINUED | OUTPATIENT
Start: 2018-06-06 | End: 2018-06-06

## 2018-06-06 RX ORDER — HYDROCODONE BITARTRATE AND ACETAMINOPHEN 5; 325 MG/1; MG/1
2 TABLET ORAL EVERY 4 HOURS PRN
Status: CANCELLED | OUTPATIENT
Start: 2018-06-06

## 2018-06-06 RX ORDER — NALOXONE HYDROCHLORIDE 0.4 MG/ML
80 INJECTION, SOLUTION INTRAMUSCULAR; INTRAVENOUS; SUBCUTANEOUS AS NEEDED
Status: DISCONTINUED | OUTPATIENT
Start: 2018-06-06 | End: 2018-06-06

## 2018-06-06 RX ORDER — NEOSTIGMINE METHYLSULFATE 0.5 MG/ML
INJECTION INTRAVENOUS AS NEEDED
Status: DISCONTINUED | OUTPATIENT
Start: 2018-06-06 | End: 2018-06-06 | Stop reason: SURG

## 2018-06-06 RX ORDER — HYDROMORPHONE HYDROCHLORIDE 1 MG/ML
0.4 INJECTION, SOLUTION INTRAMUSCULAR; INTRAVENOUS; SUBCUTANEOUS EVERY 5 MIN PRN
Status: DISCONTINUED | OUTPATIENT
Start: 2018-06-06 | End: 2018-06-06

## 2018-06-06 RX ORDER — HYDROCODONE BITARTRATE AND ACETAMINOPHEN 5; 325 MG/1; MG/1
2 TABLET ORAL AS NEEDED
Status: DISCONTINUED | OUTPATIENT
Start: 2018-06-06 | End: 2018-06-06

## 2018-06-06 RX ORDER — HYDROCODONE BITARTRATE AND ACETAMINOPHEN 5; 325 MG/1; MG/1
1 TABLET ORAL AS NEEDED
Status: DISCONTINUED | OUTPATIENT
Start: 2018-06-06 | End: 2018-06-06

## 2018-06-06 RX ORDER — ACETAMINOPHEN 325 MG/1
650 TABLET ORAL EVERY 4 HOURS PRN
Status: CANCELLED | OUTPATIENT
Start: 2018-06-06

## 2018-06-06 RX ORDER — KETOROLAC TROMETHAMINE 30 MG/ML
INJECTION, SOLUTION INTRAMUSCULAR; INTRAVENOUS AS NEEDED
Status: DISCONTINUED | OUTPATIENT
Start: 2018-06-06 | End: 2018-06-06 | Stop reason: SURG

## 2018-06-06 RX ORDER — ACETAMINOPHEN 500 MG
1000 TABLET ORAL ONCE
Status: COMPLETED | OUTPATIENT
Start: 2018-06-06 | End: 2018-06-06

## 2018-06-06 RX ORDER — HYDROMORPHONE HYDROCHLORIDE 1 MG/ML
0.6 INJECTION, SOLUTION INTRAMUSCULAR; INTRAVENOUS; SUBCUTANEOUS EVERY 5 MIN PRN
Status: DISCONTINUED | OUTPATIENT
Start: 2018-06-06 | End: 2018-06-06

## 2018-06-06 RX ORDER — DEXTROSE MONOHYDRATE 25 G/50ML
50 INJECTION, SOLUTION INTRAVENOUS
Status: DISCONTINUED | OUTPATIENT
Start: 2018-06-06 | End: 2018-06-06

## 2018-06-06 RX ORDER — FAMOTIDINE 20 MG/1
20 TABLET ORAL ONCE
Status: DISCONTINUED | OUTPATIENT
Start: 2018-06-06 | End: 2018-06-06 | Stop reason: HOSPADM

## 2018-06-06 RX ORDER — DEXAMETHASONE SODIUM PHOSPHATE 4 MG/ML
VIAL (ML) INJECTION AS NEEDED
Status: DISCONTINUED | OUTPATIENT
Start: 2018-06-06 | End: 2018-06-06 | Stop reason: SURG

## 2018-06-06 RX ORDER — ROCURONIUM BROMIDE 10 MG/ML
INJECTION, SOLUTION INTRAVENOUS AS NEEDED
Status: DISCONTINUED | OUTPATIENT
Start: 2018-06-06 | End: 2018-06-06 | Stop reason: SURG

## 2018-06-06 RX ORDER — LIDOCAINE HYDROCHLORIDE 10 MG/ML
INJECTION, SOLUTION EPIDURAL; INFILTRATION; INTRACAUDAL; PERINEURAL AS NEEDED
Status: DISCONTINUED | OUTPATIENT
Start: 2018-06-06 | End: 2018-06-06 | Stop reason: SURG

## 2018-06-06 RX ORDER — GLYCOPYRROLATE 0.2 MG/ML
INJECTION INTRAMUSCULAR; INTRAVENOUS AS NEEDED
Status: DISCONTINUED | OUTPATIENT
Start: 2018-06-06 | End: 2018-06-06 | Stop reason: SURG

## 2018-06-06 RX ORDER — BUPIVACAINE HYDROCHLORIDE 2.5 MG/ML
INJECTION, SOLUTION EPIDURAL; INFILTRATION; INTRACAUDAL AS NEEDED
Status: DISCONTINUED | OUTPATIENT
Start: 2018-06-06 | End: 2018-06-06 | Stop reason: HOSPADM

## 2018-06-06 RX ORDER — HYDROCODONE BITARTRATE AND ACETAMINOPHEN 5; 325 MG/1; MG/1
1-2 TABLET ORAL EVERY 6 HOURS PRN
Qty: 15 TABLET | Refills: 0 | Status: SHIPPED | OUTPATIENT
Start: 2018-06-06 | End: 2019-03-27

## 2018-06-06 RX ORDER — ONDANSETRON 2 MG/ML
INJECTION INTRAMUSCULAR; INTRAVENOUS AS NEEDED
Status: DISCONTINUED | OUTPATIENT
Start: 2018-06-06 | End: 2018-06-06 | Stop reason: SURG

## 2018-06-06 RX ORDER — ONDANSETRON 2 MG/ML
4 INJECTION INTRAMUSCULAR; INTRAVENOUS ONCE AS NEEDED
Status: DISCONTINUED | OUTPATIENT
Start: 2018-06-06 | End: 2018-06-06

## 2018-06-06 RX ORDER — ONDANSETRON 4 MG/1
4 TABLET, FILM COATED ORAL EVERY 8 HOURS PRN
Status: CANCELLED | OUTPATIENT
Start: 2018-06-06

## 2018-06-06 RX ADMIN — GLYCOPYRROLATE 0.4 MG: 0.2 INJECTION INTRAMUSCULAR; INTRAVENOUS at 13:54:00

## 2018-06-06 RX ADMIN — KETOROLAC TROMETHAMINE 30 MG: 30 INJECTION, SOLUTION INTRAMUSCULAR; INTRAVENOUS at 14:03:00

## 2018-06-06 RX ADMIN — SODIUM CHLORIDE, SODIUM LACTATE, POTASSIUM CHLORIDE, CALCIUM CHLORIDE: 600; 310; 30; 20 INJECTION, SOLUTION INTRAVENOUS at 12:53:00

## 2018-06-06 RX ADMIN — DEXAMETHASONE SODIUM PHOSPHATE 4 MG: 4 MG/ML VIAL (ML) INJECTION at 12:56:00

## 2018-06-06 RX ADMIN — ONDANSETRON 4 MG: 2 INJECTION INTRAMUSCULAR; INTRAVENOUS at 12:56:00

## 2018-06-06 RX ADMIN — LIDOCAINE HYDROCHLORIDE 50 MG: 10 INJECTION, SOLUTION EPIDURAL; INFILTRATION; INTRACAUDAL; PERINEURAL at 12:56:00

## 2018-06-06 RX ADMIN — NEOSTIGMINE METHYLSULFATE 3 MG: 0.5 INJECTION INTRAVENOUS at 13:54:00

## 2018-06-06 RX ADMIN — SODIUM CHLORIDE, SODIUM LACTATE, POTASSIUM CHLORIDE, CALCIUM CHLORIDE: 600; 310; 30; 20 INJECTION, SOLUTION INTRAVENOUS at 14:07:00

## 2018-06-06 RX ADMIN — ROCURONIUM BROMIDE 40 MG: 10 INJECTION, SOLUTION INTRAVENOUS at 12:56:00

## 2018-06-06 NOTE — ANESTHESIA PREPROCEDURE EVALUATION
Anesthesia PreOp Note    HPI:     Mayra Diaz is a 30year old female who presents for preoperative consultation requested by: Deidra Obregon MD    Date of Surgery: 6/6/2018    Procedure(s):  LAPAROSCOPIC SALPINGECTOMY  ENDOMETRIAL ABLATION  M Hypertension Mother    • Hypertension Sister    • Lipids Sister    • Thyroid disease Sister    • Stroke Brother    • Diabetes Maternal Grandmother        Social History  Social History   Marital status:   Spouse name: N/A    Years of education: N/A Consent Plan and Risks Discussed With:  Patient      I have informed Shannon Pi and/or legal guardian or family member of the nature of the anesthetic plan, benefits, risks including possible dental damage if relevant, major complications, and a

## 2018-06-06 NOTE — PROGRESS NOTES
Serum HCG yesterday am negative. More than 24 hours since testing, notified anesthesia who stated no need for UCG today.

## 2018-06-06 NOTE — INTERVAL H&P NOTE
Pre-op Diagnosis: Menorrhagia with regular cycle [N92.0]  Request for sterilization [Z30.2]    The above referenced H&P was reviewed by Jenniffer Garcia MD on 6/6/2018, the patient was examined and no significant changes have occurred in the patient's cond

## 2018-06-06 NOTE — OPERATIVE REPORT
Laparoscopic Bilateral Salpingectomy and Novasure Ablation Operative Note:      Preoperative Diagnosis: Desires permanent sterilization and Heavy Menstrual Bleeding   Postoperative Diagnosis: Same    Procedure: Laparoscopic Bilateral Salpingectomy and Hyst pelvis and the abdomen was evacuated of pneumoperitoneum. The skin incisions were closed in subcuticular manner with 4-0 Monocryl. Each incision was clean and covered with Tegaderm. At this time attention was turned below. The abbott was removed.  The ant

## 2018-06-06 NOTE — ANESTHESIA POSTPROCEDURE EVALUATION
Patient: James Rasmussen    Procedure Summary     Date:  06/06/18 Room / Location:  [address] [place] [address] OR 02 / [address] [place] [address] OR    Anesthesia Start:  1902 Anesthesia Stop:  2166    Procedures:       LAPAROSCOPIC SALPINGECTOMY (Bilateral )      ENDOMETRIAL ABLATION

## 2018-06-07 ENCOUNTER — TELEPHONE (OUTPATIENT)
Dept: OBGYN CLINIC | Facility: CLINIC | Age: 38
End: 2018-06-07

## 2018-06-07 NOTE — TELEPHONE ENCOUNTER
Called patient to see how she is doing. Pt called HARPREET on call last night with chest pain and shoulder pain (he thought it was phrenic nerve pain). Pt states pain completely resolved this am. She is feeling well. She feels sore but no real pain.  No bleeding

## 2018-06-21 ENCOUNTER — TELEPHONE (OUTPATIENT)
Dept: PEDIATRICS CLINIC | Facility: CLINIC | Age: 38
End: 2018-06-21

## 2019-01-24 NOTE — TELEPHONE ENCOUNTER
Pt calling in BS log. CARLY reviewed log and changed pt's insulin to 2+8+4+21N. Pt verbalized understanding. Pt to be admitted for further evaluation. Pt resting in bed. No c/o pain and requested something to drink. Pt eating snack and drink provided. Call bell within reach. Door open. Room checked for safety.

## 2019-03-27 ENCOUNTER — HOSPITAL ENCOUNTER (OUTPATIENT)
Age: 39
Discharge: HOME | End: 2019-03-27
Attending: EMERGENCY MEDICINE
Payer: COMMERCIAL

## 2019-03-27 VITALS
TEMPERATURE: 99 F | WEIGHT: 140 LBS | HEIGHT: 61 IN | DIASTOLIC BLOOD PRESSURE: 84 MMHG | RESPIRATION RATE: 18 BRPM | OXYGEN SATURATION: 100 % | HEART RATE: 100 BPM | BODY MASS INDEX: 26.43 KG/M2 | SYSTOLIC BLOOD PRESSURE: 125 MMHG

## 2019-03-27 DIAGNOSIS — J10.1 INFLUENZA A: Primary | ICD-10-CM

## 2019-03-27 LAB
POCT INFLUENZA A: POSITIVE
POCT INFLUENZA B: NEGATIVE
S PYO AG THROAT QL: NEGATIVE

## 2019-03-27 PROCEDURE — 87502 INFLUENZA DNA AMP PROBE: CPT | Performed by: EMERGENCY MEDICINE

## 2019-03-27 PROCEDURE — 99212 OFFICE O/P EST SF 10 MIN: CPT

## 2019-03-27 PROCEDURE — 99202 OFFICE O/P NEW SF 15 MIN: CPT

## 2019-03-27 PROCEDURE — 87430 STREP A AG IA: CPT

## 2019-03-27 NOTE — ED PROVIDER NOTES
Patient Seen in: [facility] Immediate Care In [address] [place] [address]    History   Patient presents with:  Sore Throat    Stated Complaint: fever, cough, sore throat    HPI    Patient is a 28-year-old female who presents to the urgent care with a chief complaint atraumatic. Mouth/Throat: Uvula is midline. Posterior oropharyngeal erythema present. No tonsillar exudate. Eyes: Conjunctivae and EOM are normal. Pupils are equal, round, and reactive to light. Neck: Neck supple.    Cardiovascular: Normal rate, regul

## 2019-03-27 NOTE — ED INITIAL ASSESSMENT (HPI)
Fever since Monday cough  No flu shot. Has rosea so she is red. No sore throat. Used tylenol and sudafed. /

## 2019-04-18 ENCOUNTER — APPOINTMENT (OUTPATIENT)
Dept: LAB | Facility: HOSPITAL | Age: 39
End: 2019-04-18
Attending: OBSTETRICS & GYNECOLOGY
Payer: COMMERCIAL

## 2019-04-18 ENCOUNTER — TELEPHONE (OUTPATIENT)
Dept: OBGYN CLINIC | Facility: CLINIC | Age: 39
End: 2019-04-18

## 2019-04-18 DIAGNOSIS — R30.0 BURNING WITH URINATION: ICD-10-CM

## 2019-04-18 DIAGNOSIS — R35.0 URINARY FREQUENCY: Primary | ICD-10-CM

## 2019-04-18 DIAGNOSIS — R35.0 URINARY FREQUENCY: ICD-10-CM

## 2019-04-18 PROCEDURE — 81001 URINALYSIS AUTO W/SCOPE: CPT

## 2019-04-18 NOTE — TELEPHONE ENCOUNTER
Pt calling to report that she thinks she has a UTI. Pt reports urinary frequency & urgency and burning with urination. Pt also stated she has a full bladder feeling after urination. Pt denies back pain or fevers.  Pt informed order will be placed for UA and

## 2019-04-19 ENCOUNTER — LAB ENCOUNTER (OUTPATIENT)
Dept: LAB | Facility: HOSPITAL | Age: 39
End: 2019-04-19
Attending: OBSTETRICS & GYNECOLOGY
Payer: COMMERCIAL

## 2019-04-19 ENCOUNTER — TELEPHONE (OUTPATIENT)
Dept: OBGYN CLINIC | Facility: CLINIC | Age: 39
End: 2019-04-19

## 2019-04-19 DIAGNOSIS — R30.9 PAINFUL URINATION: ICD-10-CM

## 2019-04-19 DIAGNOSIS — R30.9 PAINFUL URINATION: Primary | ICD-10-CM

## 2019-04-19 PROCEDURE — 81001 URINALYSIS AUTO W/SCOPE: CPT

## 2019-04-19 PROCEDURE — 87086 URINE CULTURE/COLONY COUNT: CPT

## 2019-04-19 PROCEDURE — 87186 SC STD MICRODIL/AGAR DIL: CPT

## 2019-04-19 PROCEDURE — 87077 CULTURE AEROBIC IDENTIFY: CPT

## 2019-04-19 NOTE — TELEPHONE ENCOUNTER
Informed pt that Tivis Cea states UA negative, but if symptoms continue will want another UA for it may be early in an uti process. Pt states that she feels symptoms of an uti.   She is having urgency, frequency, burning with urination and a full bladder w

## 2019-04-19 NOTE — TELEPHONE ENCOUNTER
Informed pt that [address] wants pt to do an UA and an Urine Culture. Pt states that she will go for them and call us for the results.

## 2019-04-20 ENCOUNTER — TELEPHONE (OUTPATIENT)
Dept: OBGYN CLINIC | Facility: CLINIC | Age: 39
End: 2019-04-20

## 2019-04-20 NOTE — TELEPHONE ENCOUNTER
----- Message from Robyn Alonso MD sent at 4/20/2019  8:44 AM CDT -----  Please let patient know that UA is still negative.  We are waiting on the culture

## 2019-04-20 NOTE — TELEPHONE ENCOUNTER
Pt informed of KCB's msg and advised to call on Monday for cx results. States understanding and agrees wit plan.

## 2019-04-20 NOTE — TELEPHONE ENCOUNTER
Msg to [facility] to review and advise UA from 4/19. Cx still pending. Per 4/19 telephone encounter UA was repeated d/t pt still symptomatic.

## 2019-04-23 ENCOUNTER — TELEPHONE (OUTPATIENT)
Dept: OBGYN CLINIC | Facility: CLINIC | Age: 39
End: 2019-04-23

## 2019-04-23 RX ORDER — NITROFURANTOIN 25; 75 MG/1; MG/1
100 CAPSULE ORAL 2 TIMES DAILY
Qty: 14 CAPSULE | Refills: 0 | Status: SHIPPED | OUTPATIENT
Start: 2019-04-23 | End: 2019-04-30

## 2019-04-23 NOTE — TELEPHONE ENCOUNTER
Pt had UA done on 4/18 and 4/19 and both negative. Urine culture on 4/19 result is 10,000 - 50,000 Cfu/ml escherichia coli. Pt reports her symptoms are worsening.  Reports frequency, burning with urination and gets \"goose bumps\" all over her body when s

## 2019-04-23 NOTE — TELEPHONE ENCOUNTER
Macrobid sent to pharmacy for patient to take for 7 days. She does have some e. Coli in her urine. Call if symptoms continue after Abx can consider IC workup.

## 2019-04-23 NOTE — TELEPHONE ENCOUNTER
Informed pt that [address] stated that Mayra Barrios sent to the pharmacy for her to take for 7 days. Informed pt that she does have some e coli in her urine. Informed pt to call if symptoms continue after antibiotic, can consider IC workup. Pt stated understanding.

## 2019-11-12 ENCOUNTER — TELEPHONE (OUTPATIENT)
Dept: OPHTHALMOLOGY | Facility: CLINIC | Age: 39
End: 2019-11-12

## 2019-11-12 ENCOUNTER — HOSPITAL ENCOUNTER (OUTPATIENT)
Age: 39
Discharge: HOME | End: 2019-11-12
Attending: EMERGENCY MEDICINE
Payer: COMMERCIAL

## 2019-11-12 VITALS
HEIGHT: 61 IN | BODY MASS INDEX: 24.55 KG/M2 | RESPIRATION RATE: 16 BRPM | HEART RATE: 72 BPM | SYSTOLIC BLOOD PRESSURE: 105 MMHG | DIASTOLIC BLOOD PRESSURE: 72 MMHG | WEIGHT: 130 LBS | OXYGEN SATURATION: 100 %

## 2019-11-12 DIAGNOSIS — H00.011 HORDEOLUM EXTERNUM OF RIGHT UPPER EYELID: Primary | ICD-10-CM

## 2019-11-12 PROCEDURE — 99213 OFFICE O/P EST LOW 20 MIN: CPT

## 2019-11-12 PROCEDURE — 99214 OFFICE O/P EST MOD 30 MIN: CPT

## 2019-11-12 RX ORDER — POLYMYXIN B SULFATE AND TRIMETHOPRIM 1; 10000 MG/ML; [USP'U]/ML
1 SOLUTION OPHTHALMIC EVERY 6 HOURS
Qty: 1 BOTTLE | Refills: 0 | Status: SHIPPED | OUTPATIENT
Start: 2019-11-12 | End: 2019-11-17

## 2019-11-12 RX ORDER — AMOXICILLIN AND CLAVULANATE POTASSIUM 875; 125 MG/1; MG/1
1 TABLET, FILM COATED ORAL 2 TIMES DAILY
Qty: 20 TABLET | Refills: 0 | Status: SHIPPED | OUTPATIENT
Start: 2019-11-12 | End: 2019-11-22

## 2019-11-12 NOTE — TELEPHONE ENCOUNTER
Pt. States that she has an infected stye on R eye lid, and is requesting to see any Dr. Candice Decker. States that she did go to Urgent Care today and antibiotics were prescribed.  Pt. States that the stye is on her eyelid, and that there is swelling, redness and its

## 2019-11-12 NOTE — ED PROVIDER NOTES
Patient Seen in: [facility] Immediate Care In [address] [place] [address]      History   Patient presents with:  Eyelid Swelling    Stated Complaint: swollen eye    HPI    Patient presents to the immediate care center today with swelling of her right upper eyelid. Pulse 72   Resp 16   Ht 154.9 cm (5' 1\")   Wt 59 kg   SpO2 100%   BMI 24.56 kg/m²     Right Eye Chart Acuity: 20/50, Uncorrected  Left Eye Chart Acuity: 20/40, Uncorrected    Physical Exam  Vitals signs and nursing note reviewed.    Constitutional:       [address] [facility]  [phone]    Schedule an appointment as soon as possible for a visit           Medications Prescribed:  Current Discharge Medication List    START taking these medications    Amoxicillin-Pot Clavulanate 875-125 MG Oral Tab  Take 1

## 2019-11-12 NOTE — TELEPHONE ENCOUNTER
Stye R x 2 days. Urgent care Rxd antibiotics and hot compresses. Advised pt to use warm compresses as hot as she can tolerate 20+ times a day x 5 days. Not to burn herself. Pt will try this first and call back if no improvement. No apt made at this time.

## 2020-12-07 NOTE — L&D DELIVERY NOTE
[facility]    Vaginal Delivery Note    Dillon Maxwell Patient Status:  Inpatient    1980 MRN [id number]   Location [unfilled] Attending Charla Gay MD   Hosp Day # 0 PCP Tawnya Lopez MD     Delivery     Infant Info:  Date of Naresh Jackson with patient

## 2021-03-29 ENCOUNTER — OFFICE VISIT (OUTPATIENT)
Dept: OBGYN CLINIC | Facility: CLINIC | Age: 41
End: 2021-03-29
Payer: COMMERCIAL

## 2021-03-29 VITALS
HEART RATE: 77 BPM | SYSTOLIC BLOOD PRESSURE: 109 MMHG | DIASTOLIC BLOOD PRESSURE: 75 MMHG | BODY MASS INDEX: 26 KG/M2 | WEIGHT: 135.38 LBS

## 2021-03-29 DIAGNOSIS — N89.8 VAGINAL ITCHING: ICD-10-CM

## 2021-03-29 DIAGNOSIS — N94.10 DYSPAREUNIA IN FEMALE: ICD-10-CM

## 2021-03-29 DIAGNOSIS — Z01.419 ENCOUNTER FOR GYNECOLOGICAL EXAMINATION WITHOUT ABNORMAL FINDING: Primary | ICD-10-CM

## 2021-03-29 PROCEDURE — 99395 PREV VISIT EST AGE 18-39: CPT | Performed by: OBSTETRICS & GYNECOLOGY

## 2021-03-29 PROCEDURE — 3078F DIAST BP <80 MM HG: CPT | Performed by: OBSTETRICS & GYNECOLOGY

## 2021-03-29 PROCEDURE — 3074F SYST BP LT 130 MM HG: CPT | Performed by: OBSTETRICS & GYNECOLOGY

## 2021-04-01 PROBLEM — O42.90 AMNIOTIC FLUID LEAKING (HCC): Status: RESOLVED | Noted: 2017-09-21 | Resolved: 2021-04-01

## 2021-04-01 PROBLEM — O42.90 AMNIOTIC FLUID LEAKING: Status: RESOLVED | Noted: 2017-09-21 | Resolved: 2021-04-01

## 2021-04-01 PROBLEM — Z36.89 NST (NON-STRESS TEST) REACTIVE: Status: RESOLVED | Noted: 2017-09-06 | Resolved: 2021-04-01

## 2021-04-01 PROBLEM — O24.419 GESTATIONAL DIABETES MELLITUS, CLASS A2 (HCC): Status: RESOLVED | Noted: 2017-09-21 | Resolved: 2021-04-01

## 2021-04-01 PROBLEM — O24.419 GESTATIONAL DIABETES MELLITUS, CLASS A2: Status: RESOLVED | Noted: 2017-09-21 | Resolved: 2021-04-01

## 2021-04-01 PROBLEM — Z36.89 NST (NON-STRESS TEST) REACTIVE (HCC): Status: RESOLVED | Noted: 2017-09-06 | Resolved: 2021-04-01

## 2021-04-01 NOTE — PROGRESS NOTES
Kishore Kathleen is a 47year old female [id number] No LMP recorded. Patient has had an ablation. Patient presents with:  Gyn Exam: Annual exam -- Chanell Schofield pt.  No periods due to ablation  Vaginal Problem: vaginal itching -- has improved now  Gyn Problem: pain • TUBAL LIGATION  2018    Bilateral salpingectomy at time of ablation     OB History     T4    L4    SAB1  TAB0  Ectopic0  Multiple0  Live Births4      SOCIAL HISTORY:  Tobacco Use: Low Risk       Smoking Tobacco Use: Never Smoker      Smo Appropriate mood and affect    Pelvic Exam:  External Genitalia:  normal appearance, hair distribution, and no lesions  Urethral Meatus:   normal in size, location, without lesions and prolapse  Bladder:    no fullness, masses or tenderness  Vagina:    nor

## 2021-07-01 ENCOUNTER — WALK IN (OUTPATIENT)
Dept: URGENT CARE | Age: 41
End: 2021-07-01

## 2021-07-01 ENCOUNTER — TELEPHONE (OUTPATIENT)
Dept: OBGYN CLINIC | Facility: CLINIC | Age: 41
End: 2021-07-01

## 2021-07-01 ENCOUNTER — APPOINTMENT (OUTPATIENT)
Dept: LAB | Age: 41
End: 2021-07-01

## 2021-07-01 VITALS
WEIGHT: 128 LBS | RESPIRATION RATE: 16 BRPM | TEMPERATURE: 97.2 F | BODY MASS INDEX: 24.17 KG/M2 | HEIGHT: 61 IN | OXYGEN SATURATION: 99 % | HEART RATE: 78 BPM | SYSTOLIC BLOOD PRESSURE: 99 MMHG | DIASTOLIC BLOOD PRESSURE: 68 MMHG

## 2021-07-01 DIAGNOSIS — R39.9 GU (GENITOURINARY) SYMPTOMS: ICD-10-CM

## 2021-07-01 DIAGNOSIS — N30.01 ACUTE CYSTITIS WITH HEMATURIA: Primary | ICD-10-CM

## 2021-07-01 LAB
APPEARANCE UR: CLEAR
BACTERIA #/AREA URNS HPF: ABNORMAL /HPF
BILIRUB UR QL STRIP: NEGATIVE
COLOR UR: ABNORMAL
GLUCOSE UR STRIP-MCNC: NEGATIVE MG/DL
HGB UR QL STRIP: ABNORMAL
HYALINE CASTS #/AREA URNS LPF: ABNORMAL /LPF
KETONES UR STRIP-MCNC: NEGATIVE MG/DL
LEUKOCYTE ESTERASE UR QL STRIP: NEGATIVE
NITRITE UR QL STRIP: POSITIVE
PH UR STRIP: 7 [PH] (ref 5–7)
PROT UR STRIP-MCNC: NEGATIVE MG/DL
RBC #/AREA URNS HPF: ABNORMAL /HPF
SP GR UR STRIP: 1.01 (ref 1–1.03)
SQUAMOUS #/AREA URNS HPF: ABNORMAL /HPF
UROBILINOGEN UR STRIP-MCNC: 0.2 MG/DL
WBC #/AREA URNS HPF: ABNORMAL /HPF

## 2021-07-01 PROCEDURE — 99203 OFFICE O/P NEW LOW 30 MIN: CPT | Performed by: STUDENT IN AN ORGANIZED HEALTH CARE EDUCATION/TRAINING PROGRAM

## 2021-07-01 PROCEDURE — 81001 URINALYSIS AUTO W/SCOPE: CPT | Performed by: INTERNAL MEDICINE

## 2021-07-01 PROCEDURE — 87086 URINE CULTURE/COLONY COUNT: CPT | Performed by: INTERNAL MEDICINE

## 2021-07-01 PROCEDURE — 87186 SC STD MICRODIL/AGAR DIL: CPT | Performed by: INTERNAL MEDICINE

## 2021-07-01 PROCEDURE — 87088 URINE BACTERIA CULTURE: CPT | Performed by: INTERNAL MEDICINE

## 2021-07-01 RX ORDER — NITROFURANTOIN 25; 75 MG/1; MG/1
100 CAPSULE ORAL 2 TIMES DAILY
Qty: 10 CAPSULE | Refills: 0 | Status: SHIPPED | OUTPATIENT
Start: 2021-07-01 | End: 2021-07-06

## 2021-07-01 NOTE — TELEPHONE ENCOUNTER
Patient has been experiencing UTI symptoms since Sunday. She has a frequency to urinate and it burns when she does. She has tried taking Uristat to help but it is no longer effective. She is hoping a prescription can be sent to a pharmacy in [place] because she is out of town with her family. Please advise.

## 2021-07-01 NOTE — TELEPHONE ENCOUNTER
Pt calling to report she thinks she has a UTI. Pt stated she has been experiencing burning with urination, urinary urgency, and full bladder feeling after urination since Saturday/sunday. Pt stated that sx's have progressively gotten worse. Pt stated that she has IC but has not had a flare up in years and when she does have flare ups they go away within a few days. Pt stated that she does not believe this is an IC flare. Pt stated she has tried uristat with minimal relief. Explained to pt that since she is out of town and cannot come to our lab, it is advised that she goes to UC or IC somewhere in [place]. Explained to pt that our doctors will not send rx without a UA being done. Pt verbalized understanding and will go to IC in [place]. Pt advised to push fluids. Message to [facility] for any further recs.

## 2021-07-01 NOTE — TELEPHONE ENCOUNTER
Bactrim DS bid x 3days since out of town -- send to that pharm.   If Sx do not improve, will need urine culture upon return

## 2021-07-03 LAB — BACTERIA UR CULT: ABNORMAL

## 2021-11-29 NOTE — TELEPHONE ENCOUNTER
Patient calling to check on status of PA. States last time it took 3 months for authorization and hoping it does not take that long this time.   Please try to schedule IOL on 09-26 AM for A-2 GDM. Let patient know. I already did orders and left note for Dr MENDOZA.

## 2022-04-20 ENCOUNTER — HOSPITAL ENCOUNTER (OUTPATIENT)
Age: 42
Discharge: HOME | End: 2022-04-20
Payer: COMMERCIAL

## 2022-04-20 VITALS
RESPIRATION RATE: 16 BRPM | DIASTOLIC BLOOD PRESSURE: 63 MMHG | WEIGHT: 135 LBS | HEIGHT: 61 IN | TEMPERATURE: 98 F | BODY MASS INDEX: 25.49 KG/M2 | OXYGEN SATURATION: 100 % | SYSTOLIC BLOOD PRESSURE: 102 MMHG | HEART RATE: 91 BPM

## 2022-04-20 DIAGNOSIS — J06.9 VIRAL UPPER RESPIRATORY TRACT INFECTION: Primary | ICD-10-CM

## 2022-04-20 DIAGNOSIS — J02.9 VIRAL PHARYNGITIS: ICD-10-CM

## 2022-04-20 LAB — S PYO AG THROAT QL: NEGATIVE

## 2022-06-01 ENCOUNTER — HOSPITAL ENCOUNTER (OUTPATIENT)
Age: 42
Discharge: HOME | End: 2022-06-01
Payer: COMMERCIAL

## 2022-06-01 VITALS
OXYGEN SATURATION: 100 % | WEIGHT: 130 LBS | TEMPERATURE: 98 F | SYSTOLIC BLOOD PRESSURE: 115 MMHG | HEART RATE: 91 BPM | HEIGHT: 61 IN | RESPIRATION RATE: 16 BRPM | BODY MASS INDEX: 24.55 KG/M2 | DIASTOLIC BLOOD PRESSURE: 69 MMHG

## 2022-06-01 DIAGNOSIS — J02.9 SORE THROAT: ICD-10-CM

## 2022-06-01 DIAGNOSIS — Z20.822 ENCOUNTER FOR LABORATORY TESTING FOR COVID-19 VIRUS: Primary | ICD-10-CM

## 2022-06-01 LAB
S PYO AG THROAT QL: NEGATIVE
SARS-COV-2 RNA RESP QL NAA+PROBE: NOT DETECTED

## 2022-06-01 PROCEDURE — 99203 OFFICE O/P NEW LOW 30 MIN: CPT | Performed by: PHYSICIAN ASSISTANT

## 2022-06-01 PROCEDURE — 87880 STREP A ASSAY W/OPTIC: CPT | Performed by: PHYSICIAN ASSISTANT

## 2022-06-01 PROCEDURE — U0002 COVID-19 LAB TEST NON-CDC: HCPCS | Performed by: PHYSICIAN ASSISTANT

## 2022-06-01 RX ORDER — DEXAMETHASONE SODIUM PHOSPHATE 10 MG/ML
10 INJECTION, SOLUTION INTRAMUSCULAR; INTRAVENOUS ONCE
Status: COMPLETED | OUTPATIENT
Start: 2022-06-01 | End: 2022-06-01

## 2022-06-02 NOTE — ED INITIAL ASSESSMENT (HPI)
Pt presents with headache, sore throat, body aches, fever. Pt reports temp last night 101. Last dose of Motrin was at 430p today.

## 2022-06-15 ENCOUNTER — OFFICE VISIT (OUTPATIENT)
Dept: FAMILY MEDICINE CLINIC | Facility: CLINIC | Age: 42
End: 2022-06-15
Payer: COMMERCIAL

## 2022-06-15 VITALS
TEMPERATURE: 103 F | DIASTOLIC BLOOD PRESSURE: 86 MMHG | OXYGEN SATURATION: 98 % | BODY MASS INDEX: 24.55 KG/M2 | SYSTOLIC BLOOD PRESSURE: 142 MMHG | WEIGHT: 130 LBS | HEART RATE: 126 BPM | RESPIRATION RATE: 18 BRPM | HEIGHT: 61 IN

## 2022-06-15 DIAGNOSIS — U07.1 COVID-19: Primary | ICD-10-CM

## 2022-06-15 DIAGNOSIS — R50.9 FEVER, UNSPECIFIED FEVER CAUSE: ICD-10-CM

## 2022-06-15 LAB
POCT LOT NUMBER: ABNORMAL
RAPID SARS-COV-2 BY PCR: DETECTED

## 2022-06-15 PROCEDURE — 3079F DIAST BP 80-89 MM HG: CPT | Performed by: NURSE PRACTITIONER

## 2022-06-15 PROCEDURE — 99213 OFFICE O/P EST LOW 20 MIN: CPT | Performed by: NURSE PRACTITIONER

## 2022-06-15 PROCEDURE — 3077F SYST BP >= 140 MM HG: CPT | Performed by: NURSE PRACTITIONER

## 2022-06-15 PROCEDURE — 3008F BODY MASS INDEX DOCD: CPT | Performed by: NURSE PRACTITIONER

## 2022-06-15 PROCEDURE — U0002 COVID-19 LAB TEST NON-CDC: HCPCS | Performed by: NURSE PRACTITIONER

## 2022-06-15 RX ORDER — ACETAMINOPHEN 325 MG/1
650 TABLET ORAL ONCE
Status: COMPLETED | OUTPATIENT
Start: 2022-06-15 | End: 2022-06-15

## 2022-06-15 RX ORDER — CETIRIZINE HYDROCHLORIDE 10 MG/1
10 TABLET ORAL DAILY
COMMUNITY

## 2022-06-15 RX ADMIN — ACETAMINOPHEN 650 MG: 325 TABLET ORAL at 18:48:00

## 2022-06-16 ENCOUNTER — HOSPITAL ENCOUNTER (OUTPATIENT)
Age: 42
Discharge: HOME | End: 2022-06-16
Payer: COMMERCIAL

## 2022-06-16 VITALS
RESPIRATION RATE: 20 BRPM | WEIGHT: 130 LBS | DIASTOLIC BLOOD PRESSURE: 74 MMHG | TEMPERATURE: 102 F | SYSTOLIC BLOOD PRESSURE: 100 MMHG | OXYGEN SATURATION: 98 % | HEART RATE: 113 BPM | BODY MASS INDEX: 24.55 KG/M2 | HEIGHT: 61 IN

## 2022-06-16 DIAGNOSIS — U07.1 COVID-19: Primary | ICD-10-CM

## 2022-06-16 PROCEDURE — A9150 MISC/EXPER NON-PRESCRIPT DRU: HCPCS | Performed by: NURSE PRACTITIONER

## 2022-06-16 PROCEDURE — 99213 OFFICE O/P EST LOW 20 MIN: CPT | Performed by: NURSE PRACTITIONER

## 2022-06-16 PROCEDURE — M0222 INTRAVENOUS INJECTION, BEBTELOVIMAB, INCLUDES INJECTION AND POST ADMINISTRATIVE MONITORING: HCPCS | Performed by: NURSE PRACTITIONER

## 2022-06-16 RX ORDER — BEBTELOVIMAB 87.5 MG/ML
175 INJECTION, SOLUTION INTRAVENOUS ONCE
Status: COMPLETED | OUTPATIENT
Start: 2022-06-16 | End: 2022-06-16

## 2022-06-16 RX ORDER — ACETAMINOPHEN 500 MG
1000 TABLET ORAL ONCE
Status: COMPLETED | OUTPATIENT
Start: 2022-06-16 | End: 2022-06-16

## 2022-06-16 NOTE — ED INITIAL ASSESSMENT (HPI)
Sore throat, fever, body aches, cough started Tuesday. Covid positive yesterday at walk in clinic. Here for infusion.  Unvaccinated

## 2022-06-22 NOTE — TELEPHONE ENCOUNTER
Pt received results for urine culture and analysis and it came up negative, but she is feeling worse this morning, would like to speak to RN about it Contraindicated

## 2022-06-29 ENCOUNTER — OFFICE VISIT (OUTPATIENT)
Dept: INTERNAL MEDICINE CLINIC | Facility: CLINIC | Age: 42
End: 2022-06-29
Payer: COMMERCIAL

## 2022-06-29 VITALS
SYSTOLIC BLOOD PRESSURE: 100 MMHG | BODY MASS INDEX: 25.04 KG/M2 | HEIGHT: 61 IN | HEART RATE: 77 BPM | WEIGHT: 132.63 LBS | OXYGEN SATURATION: 99 % | DIASTOLIC BLOOD PRESSURE: 70 MMHG

## 2022-06-29 DIAGNOSIS — E78.5 HYPERLIPIDEMIA, UNSPECIFIED HYPERLIPIDEMIA TYPE: ICD-10-CM

## 2022-06-29 DIAGNOSIS — Z00.00 HEALTH MAINTENANCE EXAMINATION: Primary | ICD-10-CM

## 2022-06-29 DIAGNOSIS — E66.3 OVERWEIGHT (BMI 25.0-29.9): ICD-10-CM

## 2022-06-29 DIAGNOSIS — Z86.16 HISTORY OF COVID-19: ICD-10-CM

## 2022-06-29 PROBLEM — M20.22 HALLUX RIGIDUS OF LEFT FOOT: Status: ACTIVE | Noted: 2022-04-21

## 2022-06-29 PROBLEM — M20.22 HALLUX RIGIDUS OF LEFT FOOT: Status: RESOLVED | Noted: 2022-04-21 | Resolved: 2022-06-29

## 2022-06-29 PROCEDURE — 3074F SYST BP LT 130 MM HG: CPT | Performed by: FAMILY MEDICINE

## 2022-06-29 PROCEDURE — 96127 BRIEF EMOTIONAL/BEHAV ASSMT: CPT | Performed by: FAMILY MEDICINE

## 2022-06-29 PROCEDURE — 99385 PREV VISIT NEW AGE 18-39: CPT | Performed by: FAMILY MEDICINE

## 2022-06-29 PROCEDURE — 3078F DIAST BP <80 MM HG: CPT | Performed by: FAMILY MEDICINE

## 2022-06-29 PROCEDURE — 3008F BODY MASS INDEX DOCD: CPT | Performed by: FAMILY MEDICINE

## 2022-06-30 LAB
ABSOLUTE BASOPHILS: 28 CELLS/UL (ref 0–200)
ABSOLUTE EOSINOPHILS: 110 CELLS/UL (ref 15–500)
ABSOLUTE LYMPHOCYTES: 1331 CELLS/UL (ref 850–3900)
ABSOLUTE MONOCYTES: 391 CELLS/UL (ref 200–950)
ABSOLUTE NEUTROPHILS: 3641 CELLS/UL (ref 1500–7800)
ALBUMIN/GLOBULIN RATIO: 1.6 (CALC) (ref 1–2.5)
ALBUMIN: 4.7 G/DL (ref 3.6–5.1)
ALKALINE PHOSPHATASE: 69 U/L (ref 31–125)
ALT: 19 U/L (ref 6–29)
AST: 14 U/L (ref 10–30)
BASOPHILS: 0.5 %
BILIRUBIN, TOTAL: 0.5 MG/DL (ref 0.2–1.2)
BUN/CREATININE RATIO: 16 (CALC) (ref 6–22)
BUN: 8 MG/DL (ref 7–25)
CALCIUM: 9.7 MG/DL (ref 8.6–10.2)
CARBON DIOXIDE: 25 MMOL/L (ref 20–32)
CHLORIDE: 104 MMOL/L (ref 98–110)
CHOL/HDLC RATIO: 6.3 (CALC)
CHOLESTEROL, TOTAL: 276 MG/DL
CREATININE: 0.49 MG/DL (ref 0.5–1.1)
EGFR IF AFRICN AM: 142 ML/MIN/1.73M2
EGFR IF NONAFRICN AM: 123 ML/MIN/1.73M2
EOSINOPHILS: 2 %
GLOBULIN: 2.9 G/DL (CALC) (ref 1.9–3.7)
GLUCOSE: 85 MG/DL (ref 65–99)
HDL CHOLESTEROL: 44 MG/DL
HEMATOCRIT: 40.3 % (ref 35–45)
HEMOGLOBIN A1C: 5.3 % OF TOTAL HGB
HEMOGLOBIN: 13.5 G/DL (ref 11.7–15.5)
LDL-CHOLESTEROL: 180 MG/DL (CALC)
LYMPHOCYTES: 24.2 %
MCH: 30 PG (ref 27–33)
MCHC: 33.5 G/DL (ref 32–36)
MCV: 89.6 FL (ref 80–100)
MONOCYTES: 7.1 %
MPV: 9.7 FL (ref 7.5–12.5)
NEUTROPHILS: 66.2 %
NON-HDL CHOLESTEROL: 232 MG/DL (CALC)
PLATELET COUNT: 343 THOUSAND/UL (ref 140–400)
POTASSIUM: 4.5 MMOL/L (ref 3.5–5.3)
PROTEIN, TOTAL: 7.6 G/DL (ref 6.1–8.1)
RDW: 11.6 % (ref 11–15)
RED BLOOD CELL COUNT: 4.5 MILLION/UL (ref 3.8–5.1)
SODIUM: 138 MMOL/L (ref 135–146)
TRIGLYCERIDES: 308 MG/DL
TSH W/REFLEX TO FT4: 1.33 MIU/L
WHITE BLOOD CELL COUNT: 5.5 THOUSAND/UL (ref 3.8–10.8)

## 2022-09-08 ENCOUNTER — OFFICE VISIT (OUTPATIENT)
Dept: OBGYN CLINIC | Facility: CLINIC | Age: 42
End: 2022-09-08
Payer: COMMERCIAL

## 2022-09-08 VITALS
SYSTOLIC BLOOD PRESSURE: 96 MMHG | DIASTOLIC BLOOD PRESSURE: 68 MMHG | BODY MASS INDEX: 25 KG/M2 | HEART RATE: 82 BPM | WEIGHT: 132 LBS

## 2022-09-08 DIAGNOSIS — Z01.419 WELL WOMAN EXAM WITH ROUTINE GYNECOLOGICAL EXAM: Primary | ICD-10-CM

## 2022-09-08 DIAGNOSIS — R10.2 PELVIC PAIN: ICD-10-CM

## 2022-09-08 PROCEDURE — 3074F SYST BP LT 130 MM HG: CPT | Performed by: OBSTETRICS & GYNECOLOGY

## 2022-09-08 PROCEDURE — 99395 PREV VISIT EST AGE 18-39: CPT | Performed by: OBSTETRICS & GYNECOLOGY

## 2022-09-08 PROCEDURE — 3078F DIAST BP <80 MM HG: CPT | Performed by: OBSTETRICS & GYNECOLOGY

## 2022-09-13 LAB — HPV MRNA E6/E7: NOT DETECTED

## 2023-01-19 ENCOUNTER — HOSPITAL ENCOUNTER (OUTPATIENT)
Age: 43
Discharge: HOME | End: 2023-01-19
Payer: COMMERCIAL

## 2023-01-19 VITALS
HEART RATE: 82 BPM | BODY MASS INDEX: 25.49 KG/M2 | DIASTOLIC BLOOD PRESSURE: 81 MMHG | SYSTOLIC BLOOD PRESSURE: 107 MMHG | TEMPERATURE: 98 F | HEIGHT: 61 IN | WEIGHT: 135 LBS | RESPIRATION RATE: 20 BRPM | OXYGEN SATURATION: 100 %

## 2023-01-19 DIAGNOSIS — J02.9 ACUTE VIRAL PHARYNGITIS: Primary | ICD-10-CM

## 2023-01-19 LAB — S PYO AG THROAT QL: NEGATIVE

## 2023-01-19 PROCEDURE — 99213 OFFICE O/P EST LOW 20 MIN: CPT

## 2023-01-19 PROCEDURE — 87880 STREP A ASSAY W/OPTIC: CPT

## 2023-01-19 NOTE — DISCHARGE INSTRUCTIONS
Strep test is negative. There are no signs of infection on physical exam.  This is likely a viral illness. Please be sure to drink plenty of fluids, use Tylenol and Motrin for pain or fever. Use Flonase and Mucinex if you develop congestion. If you develop any respiratory complaints, fever that does not improve with medications or any other concerning complaints you should go to the emergency department. Otherwise follow up with your primary care provider.

## 2023-01-24 ENCOUNTER — OFFICE VISIT (OUTPATIENT)
Dept: FAMILY MEDICINE CLINIC | Facility: CLINIC | Age: 43
End: 2023-01-24
Payer: COMMERCIAL

## 2023-01-24 VITALS
SYSTOLIC BLOOD PRESSURE: 110 MMHG | TEMPERATURE: 99 F | WEIGHT: 141.38 LBS | HEIGHT: 61 IN | RESPIRATION RATE: 20 BRPM | BODY MASS INDEX: 26.69 KG/M2 | OXYGEN SATURATION: 99 % | DIASTOLIC BLOOD PRESSURE: 70 MMHG | HEART RATE: 79 BPM

## 2023-01-24 DIAGNOSIS — Z20.818 STREPTOCOCCUS EXPOSURE: ICD-10-CM

## 2023-01-24 DIAGNOSIS — J02.9 SORE THROAT: Primary | ICD-10-CM

## 2023-01-24 LAB
CONTROL LINE PRESENT WITH A CLEAR BACKGROUND (YES/NO): YES YES/NO
STREP GRP A CUL-SCR: NEGATIVE

## 2023-01-24 PROCEDURE — 99213 OFFICE O/P EST LOW 20 MIN: CPT | Performed by: NURSE PRACTITIONER

## 2023-01-24 PROCEDURE — 87147 CULTURE TYPE IMMUNOLOGIC: CPT | Performed by: NURSE PRACTITIONER

## 2023-01-24 PROCEDURE — 3008F BODY MASS INDEX DOCD: CPT | Performed by: NURSE PRACTITIONER

## 2023-01-24 PROCEDURE — 87880 STREP A ASSAY W/OPTIC: CPT | Performed by: NURSE PRACTITIONER

## 2023-01-24 PROCEDURE — 3074F SYST BP LT 130 MM HG: CPT | Performed by: NURSE PRACTITIONER

## 2023-01-24 PROCEDURE — 3078F DIAST BP <80 MM HG: CPT | Performed by: NURSE PRACTITIONER

## 2023-01-24 PROCEDURE — 87081 CULTURE SCREEN ONLY: CPT | Performed by: NURSE PRACTITIONER

## 2023-01-25 ENCOUNTER — TELEPHONE (OUTPATIENT)
Dept: TELEHEALTH | Facility: HOSPITAL | Age: 43
End: 2023-01-25

## 2023-01-25 DIAGNOSIS — J02.0 STREP PHARYNGITIS: Primary | ICD-10-CM

## 2023-01-25 RX ORDER — AMOXICILLIN 875 MG/1
875 TABLET, COATED ORAL 2 TIMES DAILY
Qty: 20 TABLET | Refills: 0 | Status: SHIPPED | OUTPATIENT
Start: 2023-01-25 | End: 2023-02-04

## 2023-01-25 NOTE — TELEPHONE ENCOUNTER
Spoke with patient. Lab reports 3+ growth of GAS. Patient had 2 negative rapid Strep test.  Throat is still very sore. She is able to eat and drink. Advised that her son Carlos Berumen' Throat Culture just reported 2+ growth of GAS. His throat is still hurting and he can still eat and drink. Rx sent to [facility] for both patient. Advised patient to follow up with Dr. Adriana Silva if not improving with each day. Advised to go directly to the ED for any worsening of symptoms.

## 2024-04-12 ENCOUNTER — HOSPITAL ENCOUNTER (OUTPATIENT)
Age: 44
Discharge: HOME | End: 2024-04-12
Payer: COMMERCIAL

## 2024-04-12 VITALS
SYSTOLIC BLOOD PRESSURE: 112 MMHG | DIASTOLIC BLOOD PRESSURE: 82 MMHG | HEART RATE: 90 BPM | OXYGEN SATURATION: 100 % | RESPIRATION RATE: 16 BRPM | TEMPERATURE: 98 F

## 2024-04-12 DIAGNOSIS — J02.9 PHARYNGITIS, UNSPECIFIED ETIOLOGY: Primary | ICD-10-CM

## 2024-04-12 LAB — S PYO AG THROAT QL: NEGATIVE

## 2024-04-12 PROCEDURE — 99213 OFFICE O/P EST LOW 20 MIN: CPT | Performed by: PHYSICIAN ASSISTANT

## 2024-04-12 PROCEDURE — 87880 STREP A ASSAY W/OPTIC: CPT | Performed by: PHYSICIAN ASSISTANT

## 2024-04-12 RX ORDER — AMOXICILLIN 875 MG/1
875 TABLET, COATED ORAL 2 TIMES DAILY
Qty: 20 TABLET | Refills: 0 | Status: SHIPPED | OUTPATIENT
Start: 2024-04-12 | End: 2024-04-22

## 2024-04-12 NOTE — ED PROVIDER NOTES
Chief Complaint   Patient presents with    Sore Throat       HPI:     Alicia Veliz is a 43 year old female who presents for evaluation of sore throat x 1 day, sick contacts include son pending evaluation.  Notes history of strep 2 months ago without complication.  Denies associated fever or antipyretic use since onset, afebrile on arrival, pain is a 4 out of 10.  Denies associated headache dizziness ear pain congestion dysphagia neck pain chest pain shortness of breath abdominal pain vomiting diarrhea dysuria or rash.      PFSH    PFSH asessment screens reviewed and agree.  Nurses notes reviewed I agree with documentation.    Family History   Problem Relation Age of Onset    Hypertension Father     Hyperlipidemia Father     Prostate Cancer Father     Hypertension Mother     Hyperlipidemia Mother     Cancer Mother         Kidney    Hypertension Sister     Thyroid disease Sister         Hashimotos    Hyperlipidemia Sister     Stroke Brother         30s    Arthritis Maternal Grandmother         RA    Diabetes Maternal Grandfather     No Known Problems Paternal Grandmother     No Known Problems Paternal Grandfather     Colon Cancer Neg     Breast Cancer Neg     Ovarian Cancer Neg      Family history reviewed with patient/caregiver and is not pertinent to presenting problem.  Social History     Socioeconomic History    Marital status:      Spouse name: Not on file    Number of children: Not on file    Years of education: Not on file    Highest education level: Not on file   Occupational History    Not on file   Tobacco Use    Smoking status: Never    Smokeless tobacco: Never   Vaping Use    Vaping status: Never Used   Substance and Sexual Activity    Alcohol use: Yes     Alcohol/week: 0.0 standard drinks of alcohol     Comment: occasional - twice a month 3 drinks    Drug use: No    Sexual activity: Yes     Partners: Male   Other Topics Concern    Not on file   Social History Narrative    Relationships:   - Oli    Children: 4 kids - Sara (13M), Reji (9M), Osvaldo (5M), Alexander (4F).     Pets: 2 dogs    School: N/A    Work: At home.     Origin: From [place]     Interests: Enjoys cooking, reading, training dog, kids.     Spiritual: Faith - important. [place] in [place].  is Shinto.      Social Determinants of Health     Financial Resource Strain: Not on file   Food Insecurity: Not on file   Transportation Needs: Not on file   Physical Activity: Not on file   Stress: Not on file   Social Connections: Not on file   Housing Stability: Not on file         ROS:   Positive for stated complaint: Sore throat  All other systems reviewed and negative except as noted above.  Constitutional and Vital Signs Reviewed.      Physical Exam:     Findings:    /82   Pulse 90   Temp 98.2 °F (36.8 °C) (Temporal)   Resp 16   SpO2 100%   GENERAL: well developed, well nourished, well hydrated, no distress  SKIN: good skin turgor, no obvious rashes  NECK: No nuchal rigidity.  No palpable cervical lymphadenopathy.   EXTREMITIES: no cyanosis or edema. KUMAR without difficulty  GI: soft, non-tender, normal bowel sounds  HEAD: normocephalic, atraumatic  EYES: sclera non icteric bilateral, conjunctiva clear  EARS: TMs clear bilaterally. Canals clear.  NOSE: nasal turbinates: pink, normal mucosa  THROAT: Tonsils, erythema posterior pharynx exudates bilaterally.  No visualized PTA.uvula midline, and airway patent  LUNGS: clear to auscultation bilaterally; no rales, rhonchi, or wheezes  NEURO: No focal deficits  PSYCH: Alert and oriented x3.  Answering questions appropriately.  Mood appropriate.    MDM/Assessment/Plan:   Orders for this encounter:    Orders Placed This Encounter    POCT Rapid Strep    POCT Rapid Strep    amoxicillin 875 MG Oral Tab     Sig: Take 1 tablet (875 mg total) by mouth 2 (two) times daily for 10 days.     Dispense:  20 tablet     Refill:  0       Labs performed this visit:  Recent  Results (from the past 10 hour(s))   POCT Rapid Strep    Collection Time: 04/12/24  1:58 PM   Result Value Ref Range    POCT Rapid Strep Negative Negative       MDM:  Strep negative, requesting empiric coverage based on patient's son's history.  Will prescribe previously prescribed amoxicillin as given 3 months ago without incident.  Will readdress outpatient as needed.    Diagnosis:    ICD-10-CM    1. Pharyngitis, unspecified etiology  J02.9           All results reviewed and discussed with patient.  See AVS for detailed discharge instructions for your condition today.    Follow Up with:  Rupesh Irene MD  [address] [place] [id number]  [phone]    Schedule an appointment as soon as possible for a visit in 1 week  As needed, If symptoms worsen

## 2025-01-14 NOTE — PROGRESS NOTES
FAMILY MEDICINE CLINIC NOTE    HPI  Alicia Veliz is a 44 year old female presenting for physical    #Health Maintenance  -Diet: Good. Eats a lot of vegetables, not much fruit. Limiting carbs.   -Exercise: Working out more - online with weight training. Walking with dog.   -Lung cancer screen: Not indicated  -Colon cancer screen: Not indicated  -Statin:  Will check lipid panel  -ASA: Not indicated  -Breast cancer screen: Not indicated  -Breast cancer medication to reduce risk: Declines   -Periods: LMP 2018.    -Cervical cancer screen: -9/13/22, normal pap, HPV negative  Follows with gynecology Dr Guajardo  -DEXA: Not indicated  -BRCA: Not indicated  -Intimate partner violence: Denies abuse  -HIV screen: - 3/2017 - negative  -Hep C screen: - 2/2017 negative  -Gonorrhea/chlamydia:  Not Indicated  -Syphillis: Not indicated  -TB: Not indicated  -Tobacco/alcohol: Per below  -Depression: PHQ-2 score of 0 (score >/= 3 do PHQ-9)  -Advanced Directive: Indicated     #Immunizations  -Tdap: 10/2012 - Indicated - declines  -Flu shot: Indicated - declines  -PCV13: Not indicated   -PCV20: Not indicated   -PPSV23: Not indicated   -HPV: Not indicated  -RZV (preferred) or VZL: Not indicated  -RSV: Not indicated  -COVID: Indicated     #HLD  -will monitor lipid panel    #Patient Care Team  Patient Care Team:  Rupesh Irene MD as PCP - General (Family Medicine)  Pricilla Clemens MD (OBSTETRICS & GYNECOLOGY)  Veto Cortes MD (Surgery, Orthopaedic)  Mag Guajardo MD (OBSTETRICS & GYNECOLOGY)    ROS  GENERAL: No fever/chills, no recent weight loss   HEENT: No visual changes, no changes in hearing, no sore throats  NECK: No pain, no swelling  RESP: No cough, no SOB  CV: No chest pain, no palpitations  GI: No abd pain, no N/V/D  MSK: No edema, no pain  SKIN: No new rashes  NEURO: No numbness, no tingling, no HA    HEALTH MAINTENANCE CHECKLIST  Health Maintenance Topics with due status: Overdue       Topic Date Due    Mammogram  Never done    DTaP,Tdap,and Td Vaccines 10/31/2022    Annual Physical 06/29/2023    COVID-19 Vaccine Never done    Influenza Vaccine Never done    Annual Depression Screening 01/01/2025       ALLERGIES  Allergies[1]    MEDICATIONS  No current outpatient medications on file.       ACTIVE PROBLEM  Patient Active Problem List   Diagnosis    Health maintenance examination    Hyperlipidemia       PAST MEDICAL HISTORY  Past Medical History:    Decorative tattoo    Endometriosis    Female infertility    Gestational diabetes (HCC)    Hallux rigidus of left foot    Hyperlipidemia    PAC (premature atrial contraction)    Rosacea       PAST SOCIAL HISTORY  Social History     Socioeconomic History    Marital status:      Spouse name: Not on file    Number of children: Not on file    Years of education: Not on file    Highest education level: Not on file   Occupational History    Not on file   Tobacco Use    Smoking status: Never    Smokeless tobacco: Never   Vaping Use    Vaping status: Never Used   Substance and Sexual Activity    Alcohol use: Yes     Alcohol/week: 0.0 standard drinks of alcohol     Comment: occasional - twice a month 3 drinks    Drug use: No    Sexual activity: Yes     Partners: Male   Other Topics Concern    Not on file   Social History Narrative    Relationships:  - Oli*    Children: 4 kids - Kannon (15M), Mendoza (12M), [place] (8M), Camp[place] (7F).     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     Spiritual: Episcopalian - important. [place] in [place].  is [employer].      Social Drivers of Health     Financial Resource Strain: Not on file   Food Insecurity: Not on file   Transportation Needs: Not on file   Physical Activity: Not on file   Stress: Not on file   Social Connections: Not on file   Housing Stability: Not on file       PAST SURGICAL HISTORY  Past Surgical  History:   Procedure Laterality Date    Endometrial ablation  06/2018    Tubal ligation  06/2018    Bilateral salpingectomy at time of ablation       PAST FAMILY HISTORY  Family History   Problem Relation Age of Onset    Hypertension Mother     Hyperlipidemia Mother     Cancer Mother         Kidney    Hypertension Father     Hyperlipidemia Father     Prostate Cancer Father     Hypertension Sister     Thyroid disease Sister         Hashimotos    Hyperlipidemia Sister     Stroke Brother         30s    Arthritis Maternal Grandmother         RA    Diabetes Maternal Grandfather     No Known Problems Paternal Grandmother     No Known Problems Paternal Grandfather     Colon Cancer Neg     Breast Cancer Neg     Ovarian Cancer Neg        PHYSICAL EXAM  Vitals:    01/15/25 1346   BP: 120/74   Pulse: 84   Temp: 97.4 °F (36.3 °C)   SpO2: 99%   Weight: 120 lb (54.4 kg)   Height: 5' 1\" (1.549 m)      Body mass index is 22.67 kg/m².    GENERAL: NAD  HEENT: Moist mucous membranes, no tonsillar swelling or erythema, PERRLA bilat, TM translucent and non-bulging  NECK: Supple, non-tender  RESP: CTAB, no wheezing, no rales, no rhonchi  CV: RRR, no murmurs  GI: Soft, non-distended, non-tender, no guarding, no rebound, no masses  MSK: No edema  SKIN: Warm and dry, no rashes  NEURO: Answering questions appropriately    LABS  Lab Results   Component Value Date    WBC 5.5 06/29/2022    HGB 13.5 06/29/2022    HCT 40.3 06/29/2022     06/29/2022    NEPERCENT 66.2 06/29/2022    LYPERCENT 24.2 06/29/2022    MOPERCENT 7.1 06/29/2022    EOPERCENT 2.0 06/29/2022    BAPERCENT 0.5 06/29/2022    NE 3,641 06/29/2022    LYMABS 1,331 06/29/2022    MOABSO 391 06/29/2022    EOABSO 110 06/29/2022    BAABSO 28 06/29/2022       Lab Results   Component Value Date     06/29/2022    K 4.5 06/29/2022     06/29/2022    CO2 25 06/29/2022    ANIONGAP 8 11/23/2015    BUN 8 06/29/2022    CREATSERUM 0.49 (L) 06/29/2022    BUNCREA 16 06/29/2022    GLU  85 06/29/2022    CA 9.7 06/29/2022    OSMOCALC 284 11/23/2015    GFRNAA 123 06/29/2022    GFRAA 142 06/29/2022    ALT 19 06/29/2022    AST 14 06/29/2022    ALKPHO 69 06/29/2022    BILT 0.5 06/29/2022    TP 7.6 06/29/2022    ALB 4.7 06/29/2022    GLOBULIN 2.9 06/29/2022         Lab Results   Component Value Date    CHOLEST 276 (H) 06/29/2022    TRIG 308 (H) 06/29/2022    HDL 44 (L) 06/29/2022     (H) 06/29/2022    TCHDLRATIO 6.3 (H) 06/29/2022    NONHDLC 232 (H) 06/29/2022    CALCNONHDL 195 (H) 11/23/2015        DIAGNOSTICS    ASSESSMENT/PLAN  Problem List Items Addressed This Visit          Cardiac and Vasculature    Hyperlipidemia     Continue to focus on a healthy diet and exercise.  Monitor lipid panel            Health Encounters    Health maintenance examination - Primary     Exercise and diet advised.  Lost weight while monitoring dietary habits.   CMP, lipid panel, Hba1c  Flu vaccine declined  Tdap advised - declines but will think about it  Advised COVID vaccine   Advanced directive information provided.  Mammogram         Relevant Orders    Comp Metabolic Panel (14)    Lipid Panel    HOLLEY ARNOLDO 2D+3D SCREENING BILAT (CPT=77067/05706)    Hemoglobin A1C       Return in about 1 year (around 1/15/2026) for physical.    Rupesh Irene MD  Family Medicine         [1] No Known Allergies

## 2025-01-15 ENCOUNTER — OFFICE VISIT (OUTPATIENT)
Dept: INTERNAL MEDICINE CLINIC | Facility: CLINIC | Age: 45
End: 2025-01-15
Payer: COMMERCIAL

## 2025-01-15 ENCOUNTER — MED REC SCAN ONLY (OUTPATIENT)
Dept: INTERNAL MEDICINE CLINIC | Facility: CLINIC | Age: 45
End: 2025-01-15

## 2025-01-15 VITALS
HEART RATE: 84 BPM | HEIGHT: 61 IN | DIASTOLIC BLOOD PRESSURE: 74 MMHG | OXYGEN SATURATION: 99 % | WEIGHT: 120 LBS | BODY MASS INDEX: 22.66 KG/M2 | TEMPERATURE: 97 F | SYSTOLIC BLOOD PRESSURE: 120 MMHG

## 2025-01-15 DIAGNOSIS — E78.5 HYPERLIPIDEMIA, UNSPECIFIED HYPERLIPIDEMIA TYPE: ICD-10-CM

## 2025-01-15 DIAGNOSIS — Z00.00 HEALTH MAINTENANCE EXAMINATION: Primary | ICD-10-CM

## 2025-01-15 PROBLEM — E66.3 OVERWEIGHT (BMI 25.0-29.9): Status: RESOLVED | Noted: 2022-06-29 | Resolved: 2025-01-15

## 2025-01-15 PROBLEM — Z86.16 HISTORY OF COVID-19: Status: RESOLVED | Noted: 2022-06-29 | Resolved: 2025-01-15

## 2025-01-15 PROCEDURE — 99396 PREV VISIT EST AGE 40-64: CPT | Performed by: FAMILY MEDICINE

## 2025-01-15 NOTE — PATIENT INSTRUCTIONS
PATIENT INSTRUCTIONS    Thank you for seeing me today, it was a pleasure taking care of you.  Please check out at the  and schedule a follow up appointment.  Return in about 1 year (around 1/15/2026) for physical.  Please remember that the preferred digna period for appointments is 5 minutes. This is to help maximize the amount of time that we can spend together at our visits.    Please get your labs drawn at your preferred lab.  The following imaging studies were ordered: Mammogram  Please call [phone] to schedule your imaging appointment.   Please also follow up with the following specialists: OBGYN  Please fill out the advance directive information (power of  documents) and bring a copy to our clinic.  Consider TDAP vaccine  Consider flu and COVID vaccines  Continue healthy diet   Exercise      Best,   Dr. Irene      [email or web address] INFORMATION    Here are some lab locations available to you. Please call and make a lab appointment. Please note that some of the times and availabilities are subject to change. Please refer to the [employer] webpage for the most recent updates.    [place]  [address] [place] [address]  [phone]  Monday-Friday: 7:30 AM-3:30 PM  Saturday: 7:30 AM-12 PM    [place]  [address], [place] [address]  [phone]  Monday-Friday: 7:30 AM-3:30 PM    [place]  [address] [address]  [phone]  Monday-Friday: 8 AM-4 PM  Saturday: 8 AM-1 PM    [place]  [address] [place] [address]  [phone]  Monday-Friday: 7 AM-3 PM  Saturday: 7 AM-12 PM    [place]  [address], [place] [address] [phone]  Monday-Friday: 7 AM-3 PM  Saturday: 8 AM-1 PM    [place]  [address] [place] [address] [address], [place] [address] [phone]  Monday-Friday: 6:30 AM-4 PM  Saturday: 6:30 AM-12 PM    [place]  [address], [place], [place]  [address]  [phone]  Monday-Friday: 7 AM-3 PM  Saturday: 7:30 AM-12:30 PM    [place] [facility]  [address], [place], [place] [id number] [phone]  Monday-Friday: 8 AM-4 PM  Saturday: 7 AM-12 PM

## 2025-01-15 NOTE — ASSESSMENT & PLAN NOTE
Exercise and diet advised.  Lost weight while monitoring dietary habits.   CMP, lipid panel, Hba1c  Flu vaccine declined  Tdap advised - declines but will think about it  Advised COVID vaccine   Advanced directive information provided.  Mammogram

## 2025-01-29 ENCOUNTER — HOSPITAL ENCOUNTER (OUTPATIENT)
Dept: MAMMOGRAPHY | Facility: HOSPITAL | Age: 45
Discharge: HOME | End: 2025-01-29
Attending: FAMILY MEDICINE
Payer: COMMERCIAL

## 2025-01-29 DIAGNOSIS — Z00.00 HEALTH MAINTENANCE EXAMINATION: ICD-10-CM

## 2025-01-29 PROCEDURE — 77067 SCR MAMMO BI INCL CAD: CPT | Performed by: FAMILY MEDICINE

## 2025-01-29 PROCEDURE — 77063 BREAST TOMOSYNTHESIS BI: CPT | Performed by: FAMILY MEDICINE

## 2025-05-09 ENCOUNTER — HOSPITAL ENCOUNTER (EMERGENCY)
Facility: HOSPITAL | Age: 45
Discharge: HOME | End: 2025-05-09
Attending: EMERGENCY MEDICINE
Payer: COMMERCIAL

## 2025-05-09 ENCOUNTER — APPOINTMENT (OUTPATIENT)
Dept: CT IMAGING | Facility: HOSPITAL | Age: 45
End: 2025-05-09
Attending: EMERGENCY MEDICINE
Payer: COMMERCIAL

## 2025-05-09 VITALS
HEIGHT: 61 IN | TEMPERATURE: 98 F | WEIGHT: 125 LBS | HEART RATE: 75 BPM | SYSTOLIC BLOOD PRESSURE: 95 MMHG | RESPIRATION RATE: 14 BRPM | DIASTOLIC BLOOD PRESSURE: 64 MMHG | OXYGEN SATURATION: 100 % | BODY MASS INDEX: 23.6 KG/M2

## 2025-05-09 DIAGNOSIS — N20.0 KIDNEY STONE: ICD-10-CM

## 2025-05-09 DIAGNOSIS — R10.31 RLQ ABDOMINAL PAIN: Primary | ICD-10-CM

## 2025-05-09 DIAGNOSIS — R10.9 RIGHT FLANK PAIN: ICD-10-CM

## 2025-05-09 DIAGNOSIS — N13.30 HYDRONEPHROSIS OF RIGHT KIDNEY: ICD-10-CM

## 2025-05-09 LAB
ALBUMIN SERPL-MCNC: 4.7 G/DL (ref 3.2–4.8)
ALBUMIN/GLOB SERPL: 1.8 {RATIO} (ref 1–2)
ALP LIVER SERPL-CCNC: 53 U/L (ref 37–98)
ALT SERPL-CCNC: 21 U/L (ref 10–49)
ANION GAP SERPL CALC-SCNC: 9 MMOL/L (ref 0–18)
AST SERPL-CCNC: 13 U/L (ref ?–34)
BASOPHILS # BLD AUTO: 0.03 X10(3) UL (ref 0–0.2)
BASOPHILS NFR BLD AUTO: 0.5 %
BILIRUB SERPL-MCNC: 0.3 MG/DL (ref 0.3–1.2)
BILIRUB UR QL: NEGATIVE
BUN BLD-MCNC: 13 MG/DL (ref 9–23)
BUN/CREAT SERPL: 18.8 (ref 10–20)
CALCIUM BLD-MCNC: 9.2 MG/DL (ref 8.7–10.4)
CHLORIDE SERPL-SCNC: 106 MMOL/L (ref 98–112)
CLARITY UR: CLEAR
CO2 SERPL-SCNC: 24 MMOL/L (ref 21–32)
CREAT BLD-MCNC: 0.69 MG/DL (ref 0.55–1.02)
DEPRECATED RDW RBC AUTO: 36 FL (ref 35.1–46.3)
EGFRCR SERPLBLD CKD-EPI 2021: 111 ML/MIN/1.73M2 (ref 60–?)
EOSINOPHIL # BLD AUTO: 0.09 X10(3) UL (ref 0–0.7)
EOSINOPHIL NFR BLD AUTO: 1.4 %
ERYTHROCYTE [DISTWIDTH] IN BLOOD BY AUTOMATED COUNT: 11.3 % (ref 11–15)
GLOBULIN PLAS-MCNC: 2.6 G/DL (ref 2–3.5)
GLUCOSE BLD-MCNC: 125 MG/DL (ref 70–99)
GLUCOSE UR-MCNC: NORMAL MG/DL
HCG SERPL QL: NEGATIVE
HCT VFR BLD AUTO: 39 % (ref 35–48)
HGB BLD-MCNC: 13.3 G/DL (ref 12–16)
IMM GRANULOCYTES # BLD AUTO: 0.05 X10(3) UL (ref 0–1)
IMM GRANULOCYTES NFR BLD: 0.8 %
KETONES UR-MCNC: NEGATIVE MG/DL
LEUKOCYTE ESTERASE UR QL STRIP.AUTO: 25
LYMPHOCYTES # BLD AUTO: 2.41 X10(3) UL (ref 1–4)
LYMPHOCYTES NFR BLD AUTO: 37.8 %
MCH RBC QN AUTO: 29.6 PG (ref 26–34)
MCHC RBC AUTO-ENTMCNC: 34.1 G/DL (ref 31–37)
MCV RBC AUTO: 86.9 FL (ref 80–100)
MONOCYTES # BLD AUTO: 0.39 X10(3) UL (ref 0.1–1)
MONOCYTES NFR BLD AUTO: 6.1 %
NEUTROPHILS # BLD AUTO: 3.41 X10 (3) UL (ref 1.5–7.7)
NEUTROPHILS # BLD AUTO: 3.41 X10(3) UL (ref 1.5–7.7)
NEUTROPHILS NFR BLD AUTO: 53.4 %
NITRITE UR QL STRIP.AUTO: NEGATIVE
OSMOLALITY SERPL CALC.SUM OF ELEC: 290 MOSM/KG (ref 275–295)
PH UR: 6 [PH] (ref 5–8)
PLATELET # BLD AUTO: 333 10(3)UL (ref 150–450)
POTASSIUM SERPL-SCNC: 3.8 MMOL/L (ref 3.5–5.1)
PROT SERPL-MCNC: 7.3 G/DL (ref 5.7–8.2)
PROT UR-MCNC: NEGATIVE MG/DL
RBC # BLD AUTO: 4.49 X10(6)UL (ref 3.8–5.3)
SODIUM SERPL-SCNC: 139 MMOL/L (ref 136–145)
SP GR UR STRIP: 1.02 (ref 1–1.03)
UROBILINOGEN UR STRIP-ACNC: NORMAL
WBC # BLD AUTO: 6.4 X10(3) UL (ref 4–11)

## 2025-05-09 PROCEDURE — 80053 COMPREHEN METABOLIC PANEL: CPT | Performed by: EMERGENCY MEDICINE

## 2025-05-09 PROCEDURE — 96361 HYDRATE IV INFUSION ADD-ON: CPT

## 2025-05-09 PROCEDURE — 87086 URINE CULTURE/COLONY COUNT: CPT | Performed by: EMERGENCY MEDICINE

## 2025-05-09 PROCEDURE — 85025 COMPLETE CBC W/AUTO DIFF WBC: CPT | Performed by: EMERGENCY MEDICINE

## 2025-05-09 PROCEDURE — 74176 CT ABD & PELVIS W/O CONTRAST: CPT | Performed by: EMERGENCY MEDICINE

## 2025-05-09 PROCEDURE — 81001 URINALYSIS AUTO W/SCOPE: CPT | Performed by: EMERGENCY MEDICINE

## 2025-05-09 PROCEDURE — 96375 TX/PRO/DX INJ NEW DRUG ADDON: CPT

## 2025-05-09 PROCEDURE — 84703 CHORIONIC GONADOTROPIN ASSAY: CPT | Performed by: EMERGENCY MEDICINE

## 2025-05-09 PROCEDURE — 96374 THER/PROPH/DIAG INJ IV PUSH: CPT

## 2025-05-09 PROCEDURE — 99285 EMERGENCY DEPT VISIT HI MDM: CPT

## 2025-05-09 PROCEDURE — 99284 EMERGENCY DEPT VISIT MOD MDM: CPT

## 2025-05-09 RX ORDER — ONDANSETRON 2 MG/ML
4 INJECTION INTRAMUSCULAR; INTRAVENOUS ONCE
Status: COMPLETED | OUTPATIENT
Start: 2025-05-09 | End: 2025-05-09

## 2025-05-09 RX ORDER — HYDROCODONE BITARTRATE AND ACETAMINOPHEN 5; 325 MG/1; MG/1
1 TABLET ORAL EVERY 6 HOURS PRN
Qty: 5 TABLET | Refills: 0 | Status: SHIPPED | OUTPATIENT
Start: 2025-05-09 | End: 2025-05-14

## 2025-05-09 RX ORDER — MORPHINE SULFATE 4 MG/ML
4 INJECTION, SOLUTION INTRAMUSCULAR; INTRAVENOUS ONCE
Status: COMPLETED | OUTPATIENT
Start: 2025-05-09 | End: 2025-05-09

## 2025-05-09 RX ORDER — IBUPROFEN 600 MG/1
600 TABLET, FILM COATED ORAL EVERY 6 HOURS PRN
Qty: 28 TABLET | Refills: 0 | Status: SHIPPED | OUTPATIENT
Start: 2025-05-09 | End: 2025-05-16

## 2025-05-09 RX ORDER — TAMSULOSIN HYDROCHLORIDE 0.4 MG/1
0.4 CAPSULE ORAL DAILY
Qty: 7 CAPSULE | Refills: 0 | Status: SHIPPED | OUTPATIENT
Start: 2025-05-09 | End: 2025-05-16

## 2025-05-09 RX ORDER — KETOROLAC TROMETHAMINE 15 MG/ML
15 INJECTION, SOLUTION INTRAMUSCULAR; INTRAVENOUS ONCE
Status: COMPLETED | OUTPATIENT
Start: 2025-05-09 | End: 2025-05-09

## 2025-05-09 NOTE — ED PROVIDER NOTES
Patient Seen in: [facility] Emergency Department    History   No chief complaint on file.    Stated Complaint: abdominal pain    HPI    44F hx of HLD here with complaints of RLQ abdominal pain. Pt reports yesterday afternoon she noticed pain in her R lower abdomen but it was relatively tolerable. Went to sleep however woke from sleep with severe pain in RLQ radiating to R flank at 0100. Pain is severe, constant, though waxing and waning in severity. Reports nausea but no vomiting. No diarrhea. No vaginal bleeding/discharge. Pt concerned as it feels like \"something ruptured\" and is wondering whether this is an ovarian cyst. No fever or chills. No hx of kidney stone. No dysuria, hematuria or frequency.     Past Medical History[1]    Past Surgical History[2]         Family History[3]    Short Social Hx on File[4]    Review of Systems    Positive for stated complaint: abdominal pain  Other systems are as noted in HPI.  Constitutional and vital signs reviewed.      All other systems reviewed and negative except as noted above.    PSFH elements reviewed from today and agreed except as otherwise stated in HPI.    Physical Exam     ED Triage Vitals [05/09/25 0254]   /80   Pulse 76   Resp 17   Temp 98.1 °F (36.7 °C)   Temp src Oral   SpO2 100 %   O2 Device None (Room air)       Current:BP 95/64   Pulse 75   Temp 98.1 °F (36.7 °C) (Oral)   Resp 14   Ht 154.9 cm (5' 1\")   Wt 56.7 kg   SpO2 100%   BMI 23.62 kg/m²   Pulse ox 100% on RA         Physical Exam  General Appearance: alert, uncomfortable appearing but nontoxic appearing   Eyes: pupils equal and round no pallor or injection  ENT, Mouth: mucous membranes moist  Respiratory: there are no retractions, lungs are clear to auscultation no wheezes nor crackles   Cardiovascular: regular rate and rhythm, no murmur, 2+ radial pulses bilaterally   Gastrointestinal: mild RLQ TTP on deep palpation. Negative tejeda sign. No other tenderness throughout the  abdomen. No cvat bilaterally. Normal bowel sounds.   Neurological: II-XII grossly intact  no focal deficits  Skin: warm and dry, no rashes.  Musculoskeletal: Extremities are symmetric, grossly full range of motion  Psychiatric: patient is pleasant, there is no agitation      ED Course     Labs Reviewed   URINALYSIS WITH CULTURE REFLEX - Abnormal; Notable for the following components:       Result Value    Blood Urine 2+ (*)     Leukocyte Esterase Urine 25 (*)     RBC Urine 6-10 (*)     Squamous Epi. Cells Few (*)     All other components within normal limits   COMP METABOLIC PANEL (14) - Abnormal; Notable for the following components:    Glucose 125 (*)     All other components within normal limits   HCG, BETA SUBUNIT, QUAL - Normal   CBC WITH DIFFERENTIAL WITH PLATELET   RAINBOW DRAW GOLD   RAINBOW DRAW BLUE   URINE CULTURE, ROUTINE       MDM       Radiology Interpretation:  No results found.    This patient presents with onset of RLQ pain radiating to R flank     Differential diagnoses considered includes, but is not limited to:   Kidney stone  Appendicitis  Perforated viscus or abscess  Pyelonephritis   Ovarian cyst, less likely torsion but considered given waxing/waning pain     Will obtain the following tests: cbc, cmp, hcg, UA/Ucx, CTAP wo contrast  Will administer the following medications/therapies: IV NS bolus, morphine, toradol and zofran     Please see ED course for my independent review of these tests/imaging results.    Chronic conditions affecting care: hld    Workup and medications considered but not ordered: TVUS. If CT unrevealing or showing R ovarian cyst discussed with pt will proceed with TVUS for eval for possible torsion     Social Determinants of Health that impacted care: n/a     ED Course as of 05/09/25 0553  ------------------------------------------------------------  Time: 05/09 0401  Comment: Hcg neg. Cmp unremarkable   ------------------------------------------------------------  Time:  05/09 0403  Comment: Cbc unremarkable   ------------------------------------------------------------  Time: 05/09 4469  Comment: CT abdomen/pelvis without contrast  No prior imaging for comparison    IMPRESSION:    - Punctate nonobstructing calculi are present in the bilateral kidneys, more numerous on the left, without ureterolithiasis at time of imaging.  Mild fullness of the right renal pelvis, without job hydronephrosis; although not highly specific, this appearance may potentially be the result of a recently passed calculus or of UTI; recommend correlation with urinalysis.  Normal left-sided collecting system.  Unremarkable urinary bladder.    Additional findings:  -The visualized lung bases are clear.  -Normal liver, gallbladder, pancreas, spleen and adrenal glands.   -No small bowel obstruction or significant wall thickening.  Normal appendix.  No colitis or diverticulitis.  Mild constipation, with resultant fecalization of the distal small bowel contents.  No intraperitoneal free air or free fluid.  -Normal physiologic appearance of the reproductive organs, with a dominant follicle in the left ovary.  -No abdominal aortic aneurysm or retroperitoneal hemorrhage.   -No acute osseous abnormality.    ------------------------------------------------------------  Time: 05/09 0686  Comment: Patient reassessed updated with results.  States feeling much better.  On repeat exam has minimal right lower quadrant tenderness.  Discussed with her it is likely that she may have passed a small kidney stone though still waiting on urinalysis.  Signed out to Dr. Manzanares. At this time based on CT without any ovarian mass or large cyst my suspicion for ovarian torsion is quite low, but discussed with patient if worsening pain abruptly here we may proceed with TVUS. She verbalized understanding, if UA not infected or showing hematuria pt will need urology f/u for likely recently passed kidney stone            Disposition and  Plan     Clinical Impression:  1. RLQ abdominal pain    2. Right flank pain    3. Hydronephrosis of right kidney    4. Kidney stone         Disposition:  Discharge  5/9/2025  5:25 am    Follow-up:  Rupesh Irene MD  [address] [address]  [phone]    Schedule an appointment as soon as possible for a visit in 2 day(s)      [facility] Emergency Department  [address] [address]  [phone]  Go to  If symptoms worsen, right away    Ashley Lizarraga MD  [address]  [place] [address]  [phone]    Schedule an appointment as soon as possible for a visit in 1 week(s)            Medications Prescribed:  Discharge Medication List as of 5/9/2025  5:37 AM        START taking these medications    Details   tamsulosin 0.4 MG Oral Cap Take 1 capsule (0.4 mg total) by mouth daily for 7 days., Normal, Disp-7 capsule, R-0      ibuprofen 600 MG Oral Tab Take 1 tablet (600 mg total) by mouth every 6 (six) hours as needed., Normal, Disp-28 tablet, R-0      HYDROcodone-acetaminophen 5-325 MG Oral Tab Take 1 tablet by mouth every 6 (six) hours as needed for Pain., Normal, Disp-5 tablet, R-0             Supplementary Documentation:                     Vianey Felipe DO  Attending Physician  Emergency Medicine          [1]   Past Medical History:   Decorative tattoo    Endometriosis    Female infertility    Gestational diabetes (HCC)    Hallux rigidus of left foot    Hyperlipidemia    PAC (premature atrial contraction)    Rosacea   [2]   Past Surgical History:  Procedure Laterality Date    Endometrial ablation  06/2018    Tubal ligation  06/2018    Bilateral salpingectomy at time of ablation   [3]   Family History  Problem Relation Age of Onset    Hypertension Mother     Hyperlipidemia Mother     Cancer Mother         Kidney    Hypertension Father     Hyperlipidemia Father     Prostate Cancer Father     Hypertension Sister     Thyroid disease Sister         Hashimotos     Hyperlipidemia Sister     Stroke Brother         30s    Arthritis Maternal Grandmother         RA    Diabetes Maternal Grandfather     No Known Problems Paternal Grandmother     No Known Problems Paternal Grandfather     Colon Cancer Neg     Breast Cancer Neg     Ovarian Cancer Neg     Pancreatic Cancer Neg    [4]   Social History  Socioeconomic History    Marital status:    Tobacco Use    Smoking status: Never    Smokeless tobacco: Never   Vaping Use    Vaping status: Never Used   Substance and Sexual Activity    Alcohol use: Yes     Alcohol/week: 0.0 standard drinks of alcohol     Comment: occasional - twice a month 3 drinks    Drug use: No    Sexual activity: Yes     Partners: Male   Social History Narrative    Relationships:  - Oli*    Children: 4 kids - Kannon (15M), Mendoza (12M), [place] (8M), [place] (7F).     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     Spiritual: Samaritan - important. [place] in [place].  is Mormon.

## 2025-05-09 NOTE — ED INITIAL ASSESSMENT (HPI)
Patient to the ED from home for complaint of RLQ pain that started this afternoon that radiates around to lower back. Pt states she's had uti in past but isn't having any of those symptoms. AO 4 Denies N/V/D

## 2025-05-09 NOTE — ED PROVIDER NOTES
Patient received in signout.  In brief, 44-year-old female presents with right-sided abdominal pain.  CT abdomen pelvis shows mild hydronephrosis.  Pending urinalysis, possibly passed stone as feeling much better on reexamination.    Labs Reviewed   URINALYSIS WITH CULTURE REFLEX - Abnormal; Notable for the following components:       Result Value    Blood Urine 2+ (*)     Leukocyte Esterase Urine 25 (*)     RBC Urine 6-10 (*)     Squamous Epi. Cells Few (*)     All other components within normal limits   COMP METABOLIC PANEL (14) - Abnormal; Notable for the following components:    Glucose 125 (*)     All other components within normal limits   HCG, BETA SUBUNIT, QUAL - Normal   CBC WITH DIFFERENTIAL WITH PLATELET   RAINBOW DRAW GOLD   RAINBOW DRAW BLUE   URINE CULTURE, ROUTINE       Urinalysis with microhematuria likely indicates stone present prior to CT, has now passed.  Gave reassurance to patient, she will discharge

## 2025-05-09 NOTE — DISCHARGE INSTRUCTIONS
Thank you for seeking care at [facility] Emergency Department  You have been seen and evaluated for abdominal pain and discomfort.    In the emergency department, you had labs, urine, CT scan   Your testing did not show severe findings, except as noted: tiny non obstructing stones in the kidneys, mild swelling of the R kidney (hydronephrosis)     Read all instructions provided.    Remember, your care process does not end after your visit today. Please follow-up with your doctor within 1-2 days for a follow-up visit to ensure your symptoms are improving, to see if you need any further evaluation/testing, or to evaluate for any alternate diagnoses. Return to the emergency department if you develop severe abdominal pain, severe nausea and vomiting to the point where you are unable to keep down fluids, if you develop chest pain or difficulty breathing, blood in your stool, dizziness or fainting, or if you develop any other new or concerning symptoms as these could be signs of more serious medical illness. Try to stay well hydrated.

## (undated) DEVICE — SET TUBI Y FL CNTRL INFL/OTFL

## (undated) DEVICE — COVER SGL STRL LGHT HNDL BLU

## (undated) DEVICE — SUTURE VICRYL 2-0 SH

## (undated) DEVICE — STERILE LATEX POWDER-FREE SURGICAL GLOVESWITH NITRILE COATING: Brand: PROTEXIS

## (undated) DEVICE — MYOSURE SINGLE USE SEAL SET

## (undated) DEVICE — SUCTION CANISTER, 3000CC,SAFELINER: Brand: DEROYAL

## (undated) DEVICE — [HIGH FLOW INSUFFLATOR,  DO NOT USE IF PACKAGE IS DAMAGED,  KEEP DRY,  KEEP AWAY FROM SUNLIGHT,  PROTECT FROM HEAT AND RADIOACTIVE SOURCES.]: Brand: PNEUMOSURE

## (undated) DEVICE — DRESSING PETRO 18X3IN ABS NADH

## (undated) DEVICE — DISPOSABLE SUCTION/IRRIGATOR TUBE SET: Brand: AHTO

## (undated) DEVICE — SOL  .9 3000ML

## (undated) DEVICE — TROCAR: Brand: KII SLEEVE

## (undated) DEVICE — KENDALL SCD EXPRESS SLEEVES, KNEE LENGTH, MEDIUM: Brand: KENDALL SCD

## (undated) DEVICE — SOL  .9 1000ML BTL

## (undated) DEVICE — UTERINE ABLATOR NOVASURE

## (undated) DEVICE — LAPAROSCOPY: Brand: MEDLINE INDUSTRIES, INC.

## (undated) DEVICE — MEDI-VAC NON-CONDUCTIVE SUCTION TUBING: Brand: CARDINAL HEALTH

## (undated) DEVICE — 1016 S-DRAPE IRRIG POUCH 10/BOX: Brand: STERI-DRAPE™

## (undated) DEVICE — UNDYED BRAIDED (POLYGLACTIN 910), SYNTHETIC ABSORBABLE SUTURE: Brand: COATED VICRYL

## (undated) DEVICE — 9534HP TRANSPARENT DRSG W/FRAME: Brand: 3M™ TEGADERM™

## (undated) DEVICE — INSUFFLATION NEEDLE TO ESTABLISH PNEUMOPERITONEUM.: Brand: INSUFFLATION NEEDLE

## (undated) DEVICE — REM POLYHESIVE ADULT PATIENT RETURN ELECTRODE: Brand: VALLEYLAB

## (undated) DEVICE — HYSTEROSCOPY: Brand: MEDLINE INDUSTRIES, INC.

## (undated) DEVICE — TROCAR: Brand: KII FIOS FIRST ENTRY

## (undated) DEVICE — LAP LIGASURE 5MM BLUNT

## (undated) DEVICE — STERILE SURGICAL LUBRICANT, METAL TUBE: Brand: SURGILUBE

## (undated) DEVICE — SUTURE MONOCRYL 4-0 PS-2

## (undated) NOTE — LETTER
Date & Time: 3/27/2019, 6:52 PM  Patient: Leon Suazo  Encounter Provider(s):    Sender, Skylar Cunningham MD       To Whom It May Concern:    Jazmin Lopez was seen and treated in our department on 3/27/2019.  She should not return to work until 04/01/201

## (undated) NOTE — MR AVS SNAPSHOT
[place]  [address]  [phone]               Thank you for choosing us for your health care visit with Nurse.   We are glad to serve you and happy to provide you with this summary of your vis HIV Ag Ab Combo    Complete by:  Feb 23, 2017 (Approximate)    Assoc Dx:  Encounter for supervision of other normal pregnancy in first trimester [Z34.81]           Glucose 1 HR OB    Complete by:  Feb 23, 2017 (Approximate)    Assoc Dx:  Encounter for sup Visits < Visit Summaries. MyChart questions? Call [phone] for help. [facility] is NOT to be used for urgent needs. For medical emergencies, dial 911.            Visit [facility] online at  [email or web address]

## (undated) NOTE — LETTER
INSTRUCTIONS FOR PRE-SURGICAL   ANTIMICROBIAL BATH/SHOWER    Your doctor has recommended a pre-surgical CHG (chlorhexidine gluconate) shower/bath with Betasept (also sold as Hibiclens). It reduces bacteria that can potentially cause infection.   Betasept Keep out of reach of children. If swallowed get medica help or contact [facility]. Store between 60-80 degrees F. Fabric Warning! CHG WILL STAIN YOUR FABRICS! Use with care around shower curtains, towels washcloths rugs and clothes.   Wipe

## (undated) NOTE — LETTER
MINOR CASE LETTER      4/16/2018    Dear Felisha Gamble are having a Laparoscopic bilateral salpingectomy and hysteroscopy with Novasure ablation (possible polypectomy/myomectomy with Myosure) on 6/6/18 at 1:30pm.    Do not eat or drink anything (including